# Patient Record
Sex: FEMALE | Race: WHITE | Employment: FULL TIME | URBAN - METROPOLITAN AREA
[De-identification: names, ages, dates, MRNs, and addresses within clinical notes are randomized per-mention and may not be internally consistent; named-entity substitution may affect disease eponyms.]

---

## 2017-03-10 ENCOUNTER — TELEPHONE (OUTPATIENT)
Dept: FAMILY MEDICINE CLINIC | Age: 32
End: 2017-03-10

## 2017-03-22 ENCOUNTER — EMPLOYEE WELLNESS (OUTPATIENT)
Dept: OTHER | Age: 32
End: 2017-03-22

## 2017-04-04 ENCOUNTER — OFFICE VISIT (OUTPATIENT)
Dept: FAMILY MEDICINE CLINIC | Age: 32
End: 2017-04-04

## 2017-04-04 VITALS
BODY MASS INDEX: 21.22 KG/M2 | HEART RATE: 60 BPM | WEIGHT: 135.2 LBS | SYSTOLIC BLOOD PRESSURE: 108 MMHG | DIASTOLIC BLOOD PRESSURE: 72 MMHG | TEMPERATURE: 98 F | HEIGHT: 67 IN

## 2017-04-04 DIAGNOSIS — J30.9 ALLERGIC RHINITIS, UNSPECIFIED ALLERGIC RHINITIS TRIGGER, UNSPECIFIED RHINITIS SEASONALITY: ICD-10-CM

## 2017-04-04 DIAGNOSIS — F41.9 ANXIETY: ICD-10-CM

## 2017-04-04 DIAGNOSIS — Z00.00 ANNUAL PHYSICAL EXAM: Primary | ICD-10-CM

## 2017-04-04 DIAGNOSIS — J45.909 UNCOMPLICATED ASTHMA, UNSPECIFIED ASTHMA SEVERITY: ICD-10-CM

## 2017-04-04 PROCEDURE — 99395 PREV VISIT EST AGE 18-39: CPT | Performed by: FAMILY MEDICINE

## 2017-04-04 RX ORDER — CLONAZEPAM 0.5 MG/1
0.5 TABLET ORAL 2 TIMES DAILY
Qty: 30 TABLET | Refills: 1 | Status: SHIPPED | OUTPATIENT
Start: 2017-04-04 | End: 2017-11-17 | Stop reason: SDUPTHER

## 2017-04-04 RX ORDER — CETIRIZINE HYDROCHLORIDE 10 MG/1
10 TABLET ORAL DAILY PRN
COMMUNITY
End: 2021-02-15

## 2017-04-04 RX ORDER — BUDESONIDE AND FORMOTEROL FUMARATE DIHYDRATE 80; 4.5 UG/1; UG/1
2 AEROSOL RESPIRATORY (INHALATION) 2 TIMES DAILY
Qty: 1 INHALER | Refills: 11 | Status: SHIPPED | OUTPATIENT
Start: 2017-04-04 | End: 2020-12-01

## 2017-04-04 RX ORDER — MONTELUKAST SODIUM 10 MG/1
10 TABLET ORAL NIGHTLY
Qty: 30 TABLET | Refills: 11 | Status: SHIPPED | OUTPATIENT
Start: 2017-04-04 | End: 2018-04-09

## 2017-04-04 ASSESSMENT — ENCOUNTER SYMPTOMS
EYES NEGATIVE: 1
BLOOD IN STOOL: 0
DIARRHEA: 0
WHEEZING: 1
ABDOMINAL PAIN: 0
VOMITING: 0
NAUSEA: 0
SHORTNESS OF BREATH: 0

## 2017-06-13 ENCOUNTER — PATIENT MESSAGE (OUTPATIENT)
Dept: FAMILY MEDICINE CLINIC | Age: 32
End: 2017-06-13

## 2017-06-13 RX ORDER — PREDNISONE 10 MG/1
TABLET ORAL
Qty: 30 TABLET | Refills: 0 | Status: SHIPPED | OUTPATIENT
Start: 2017-06-13 | End: 2017-06-23

## 2017-07-14 ENCOUNTER — PATIENT MESSAGE (OUTPATIENT)
Dept: FAMILY MEDICINE CLINIC | Age: 32
End: 2017-07-14

## 2017-07-17 RX ORDER — CITALOPRAM 20 MG/1
20 TABLET ORAL DAILY
Qty: 30 TABLET | Refills: 1 | Status: SHIPPED | OUTPATIENT
Start: 2017-07-17 | End: 2017-09-08 | Stop reason: SDUPTHER

## 2017-09-08 RX ORDER — CITALOPRAM 20 MG/1
20 TABLET ORAL DAILY
Qty: 30 TABLET | Refills: 11 | Status: SHIPPED | OUTPATIENT
Start: 2017-09-08 | End: 2018-04-09 | Stop reason: SDUPTHER

## 2017-11-17 DIAGNOSIS — F41.9 ANXIETY: ICD-10-CM

## 2017-11-17 RX ORDER — CLONAZEPAM 0.5 MG/1
0.5 TABLET ORAL 2 TIMES DAILY
Qty: 30 TABLET | Refills: 1 | Status: SHIPPED | OUTPATIENT
Start: 2017-11-17 | End: 2019-04-29 | Stop reason: SDUPTHER

## 2017-11-17 NOTE — TELEPHONE ENCOUNTER
Rx sent to pharmacy. CG    Controlled Substances Monitoring:     Attestation: The Prescription Monitoring Report for this patient was reviewed today. Williams Santana MD)  Documentation: No signs of potential drug abuse or diversion identified.  Williams Santana MD)        Electronically signed by Williams Santana MD on 11/17/2017 at 10:17 AM

## 2017-11-29 ENCOUNTER — PATIENT MESSAGE (OUTPATIENT)
Dept: FAMILY MEDICINE CLINIC | Age: 32
End: 2017-11-29

## 2017-11-29 RX ORDER — LEVONORGESTREL / ETHINYL ESTRADIOL AND ETHINYL ESTRADIOL 150-30(84)
KIT ORAL
Qty: 91 TABLET | Refills: 3 | Status: SHIPPED | OUTPATIENT
Start: 2017-11-29 | End: 2017-11-29

## 2017-11-29 NOTE — TELEPHONE ENCOUNTER
From: Eddie Lin  To: Eleuterio Brown MD  Sent: 11/29/2017 3:47 PM EST  Subject: Prescription Question    Dr Deisy Stringer    I am messaging you about Canfield Bodily ([birth year]). At her appointment last month we have discussed birth control options, and I think she would like to try the pill, versus the shot for now. Due to her heavy flows and cramping, are we able to start her on something like Seasonique?      Thanks  Eddie Lin

## 2018-03-20 VITALS — WEIGHT: 139 LBS | BODY MASS INDEX: 21.77 KG/M2

## 2018-03-27 RX ORDER — ALBUTEROL SULFATE 90 UG/1
2 AEROSOL, METERED RESPIRATORY (INHALATION) EVERY 6 HOURS PRN
Qty: 1 INHALER | Refills: 3 | Status: SHIPPED | OUTPATIENT
Start: 2018-03-27 | End: 2019-04-29 | Stop reason: SDUPTHER

## 2018-04-09 ENCOUNTER — OFFICE VISIT (OUTPATIENT)
Dept: FAMILY MEDICINE CLINIC | Age: 33
End: 2018-04-09
Payer: COMMERCIAL

## 2018-04-09 VITALS
SYSTOLIC BLOOD PRESSURE: 110 MMHG | WEIGHT: 150 LBS | DIASTOLIC BLOOD PRESSURE: 74 MMHG | BODY MASS INDEX: 22.73 KG/M2 | HEIGHT: 68 IN | HEART RATE: 84 BPM | RESPIRATION RATE: 12 BRPM

## 2018-04-09 DIAGNOSIS — M54.41 CHRONIC LOW BACK PAIN WITH RIGHT-SIDED SCIATICA, UNSPECIFIED BACK PAIN LATERALITY: ICD-10-CM

## 2018-04-09 DIAGNOSIS — G89.29 CHRONIC LOW BACK PAIN WITH RIGHT-SIDED SCIATICA, UNSPECIFIED BACK PAIN LATERALITY: ICD-10-CM

## 2018-04-09 DIAGNOSIS — F41.9 ANXIETY: ICD-10-CM

## 2018-04-09 DIAGNOSIS — Z00.00 ANNUAL PHYSICAL EXAM: Primary | ICD-10-CM

## 2018-04-09 PROBLEM — J45.909 UNCOMPLICATED ASTHMA: Status: ACTIVE | Noted: 2018-04-09

## 2018-04-09 PROCEDURE — 99395 PREV VISIT EST AGE 18-39: CPT | Performed by: FAMILY MEDICINE

## 2018-04-09 RX ORDER — CITALOPRAM 40 MG/1
40 TABLET ORAL DAILY
Qty: 90 TABLET | Refills: 3 | Status: SHIPPED | OUTPATIENT
Start: 2018-04-09 | End: 2019-04-29 | Stop reason: ALTCHOICE

## 2018-04-09 ASSESSMENT — ENCOUNTER SYMPTOMS
SHORTNESS OF BREATH: 0
BACK PAIN: 1
GASTROINTESTINAL NEGATIVE: 1

## 2018-04-09 ASSESSMENT — PATIENT HEALTH QUESTIONNAIRE - PHQ9
SUM OF ALL RESPONSES TO PHQ9 QUESTIONS 1 & 2: 2
2. FEELING DOWN, DEPRESSED OR HOPELESS: 1
1. LITTLE INTEREST OR PLEASURE IN DOING THINGS: 1
SUM OF ALL RESPONSES TO PHQ QUESTIONS 1-9: 2

## 2018-04-10 ENCOUNTER — TELEPHONE (OUTPATIENT)
Dept: FAMILY MEDICINE CLINIC | Age: 33
End: 2018-04-10

## 2018-04-10 LAB
CHOLESTEROL/HDL RATIO: 3.3
CHOLESTEROL: 182 MG/DL
GLUCOSE: 90 MG/DL (ref 70–99)
HDLC SERPL-MCNC: 55 MG/DL
LDL CHOLESTEROL CALCULATED: 94 MG/DL
LDL/HDL RATIO: 1.7
TRIGL SERPL-MCNC: 163 MG/DL
VLDLC SERPL CALC-MCNC: 33 MG/DL

## 2018-07-23 ENCOUNTER — HOSPITAL ENCOUNTER (OUTPATIENT)
Age: 33
Discharge: HOME | End: 2018-07-23
Payer: COMMERCIAL

## 2018-07-23 ENCOUNTER — HOSPITAL ENCOUNTER (OUTPATIENT)
Dept: GENERAL RADIOLOGY | Age: 33
Discharge: HOME | End: 2018-07-23
Payer: COMMERCIAL

## 2018-07-23 DIAGNOSIS — G89.29 CHRONIC LOW BACK PAIN WITH RIGHT-SIDED SCIATICA, UNSPECIFIED BACK PAIN LATERALITY: ICD-10-CM

## 2018-07-23 DIAGNOSIS — M54.41 CHRONIC LOW BACK PAIN WITH RIGHT-SIDED SCIATICA, UNSPECIFIED BACK PAIN LATERALITY: ICD-10-CM

## 2018-07-23 PROCEDURE — 72100 X-RAY EXAM L-S SPINE 2/3 VWS: CPT

## 2018-07-24 ENCOUNTER — TELEPHONE (OUTPATIENT)
Dept: FAMILY MEDICINE CLINIC | Age: 33
End: 2018-07-24

## 2018-07-24 DIAGNOSIS — M51.35 DDD (DEGENERATIVE DISC DISEASE), THORACOLUMBAR: ICD-10-CM

## 2018-07-24 DIAGNOSIS — M54.5 LOW BACK PAIN, UNSPECIFIED BACK PAIN LATERALITY, UNSPECIFIED CHRONICITY, WITH SCIATICA PRESENCE UNSPECIFIED: Primary | ICD-10-CM

## 2018-07-26 NOTE — TELEPHONE ENCOUNTER
Patient notified and would like to pursue PT--Mae HADDAD. Order placed in epic--they will contact the patient to schedule.

## 2018-08-16 ENCOUNTER — HOSPITAL ENCOUNTER (OUTPATIENT)
Dept: PHYSICAL THERAPY | Age: 33
Setting detail: THERAPIES SERIES
Discharge: HOME | End: 2018-08-16
Payer: COMMERCIAL

## 2018-08-30 ENCOUNTER — HOSPITAL ENCOUNTER (OUTPATIENT)
Dept: PHYSICAL THERAPY | Age: 33
Setting detail: THERAPIES SERIES
Discharge: HOME | End: 2018-08-30
Payer: COMMERCIAL

## 2018-08-30 PROCEDURE — 97161 PT EVAL LOW COMPLEX 20 MIN: CPT

## 2018-08-30 ASSESSMENT — PAIN DESCRIPTION - ORIENTATION: ORIENTATION: RIGHT

## 2018-08-30 ASSESSMENT — PAIN DESCRIPTION - LOCATION: LOCATION: BACK

## 2018-08-30 ASSESSMENT — PAIN SCALES - GENERAL: PAINLEVEL_OUTOF10: 7

## 2018-08-30 ASSESSMENT — PAIN DESCRIPTION - PAIN TYPE: TYPE: CHRONIC PAIN

## 2018-08-30 ASSESSMENT — PAIN DESCRIPTION - FREQUENCY: FREQUENCY: CONTINUOUS

## 2018-08-30 NOTE — FLOWSHEET NOTE
** PLEASE SIGN, DATE AND TIME CERTIFICATION BELOW AND RETURN TO [facility] OUTPATIENT REHABILITATION (FAX #: [phone]). ATTEST/CO-SIGN IF ACCESSING VIA [employer]. THANK YOU.**    I certify that I have examined the patient below and determined that Physical Medicine and Rehabilitation service is necessary and that I approve the established plan of care for up to 90 days or as specifically noted. Attestation, signature or co-signature of physician indicates approval of certification requirements.    ________________________ ____________ __________  Physician Signature   Date   Time       [address]     Time In: 5657  Time Out: [address]  Minutes: 40  Timed Code Treatment Minutes: 0 Minutes     Date: 2018  Patient Name: Maxi Quinn,  Gender:  female        CSN: [id number]   : 1985  (34 y.o.)  Referral Date : 18     Referring Practitioner: Dr Stefanie Hodgkin      Diagnosis: Low back pain with sciatica, , DDD, thoracolumbar pain   Treatment Diagnosis: lumbar pain, right low back pain , core weakness  Additional Pertinent Hx: PMH  asthma, kidney stones. General:  PT Visit Information  Onset Date: 12  PT Insurance Information: [employer], 30% co pay,   allow 60 viist per year  $3150 deductible -- not met   Total # of Visits Approved: 60  Total # of Visits to Date: 1  Plan of Care/Certification Expiration Date: 18                        See Medical History Questionnaire for information related to allergies and medications. Subjective:  Comments: Pt concerned with cost of PT,  may be interested in doing mostly home program   x rays -- showed    DDD   Other (Comment): allergic to LATEX       Subjective: David Moses reports that she has had low back pain since she was in an [place] [address] in . She states she did have treaemnt at that time due to being pregnant and high co pays.    She stated that her back pain has become progressively worse. Pain:  Patient Currently in Pain: Yes  Pain Assessment: 0-10  Pain Level: 7  Pain Type: Chronic pain  Pain Location: Back  Pain Orientation: Right  Pain Radiating Towards: lateral thigh  anc calf on the right ,   Pain Frequency: Continuous  Effect of Pain on Daily Activities: increases with sitting, standing, lifting, bending, squatting, cleaning her house, sport  releive with lying on her stomach ,      Social/Functional History:    Lives With: Spouse  Type of Home: House  Home Layout: Two level  Home Access: Stairs to enter with rails             ADL Assistance: Independent  Homemaking Assistance: Independent  Homemaking Responsibilities: Yes  Ambulation Assistance: Independent  Transfer Assistance: Independent    Active : Yes  Occupation: Full time employment  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  standing, bending,  reaching, can sit on stool   Leisure & Hobbies: would like to return to jog- now b=pain with quick walk , and moderateincreased  pain the day after  Additional Comments: has 6 children 3 to 17 years,     Objective                 Observation/Palpation  Posture: Good  Palpation: right L5S1 , lateral sacrum , to greater trochanter tenderenss   Observation: decreased stance on the right leg,  hips level       ROM  Lumbar: lumbar flexion decreased 50%, painful, flexed to knees, extension 25% and no increase in pain ,          Strength RLE: Exception  Comment: right hip abd, flex, ane ext 4/5,     Strength LLE: WFL             Special Tests: SI joint compression right + for incresed pain , neg Left,   Other: prone-- position of confort.   prop                              Exercises  Exercise 1: focus on HEP-- stabilize pelvis, return to [facility]-- , quick walk, ? jog    Exercise 2: Supine  abd sets, glut sets, quad sets 5 sec x 10,  SLR with oppsosite leg bent up  -- 10 reps and stabilize abd musscle to aovid pain in th eright pelvis  Exercise 3:

## 2018-09-07 ENCOUNTER — HOSPITAL ENCOUNTER (OUTPATIENT)
Dept: PHYSICAL THERAPY | Age: 33
Setting detail: THERAPIES SERIES
Discharge: HOME | End: 2018-09-07
Payer: MEDICARE

## 2018-09-07 PROCEDURE — 97110 THERAPEUTIC EXERCISES: CPT

## 2018-09-07 ASSESSMENT — PAIN SCALES - GENERAL: PAINLEVEL_OUTOF10: 2

## 2018-09-07 ASSESSMENT — PAIN DESCRIPTION - LOCATION: LOCATION: BACK

## 2018-09-07 ASSESSMENT — PAIN DESCRIPTION - PAIN TYPE: TYPE: CHRONIC PAIN

## 2018-09-07 ASSESSMENT — PAIN DESCRIPTION - ORIENTATION: ORIENTATION: RIGHT

## 2018-09-10 ENCOUNTER — HOSPITAL ENCOUNTER (OUTPATIENT)
Dept: PHYSICAL THERAPY | Age: 33
Setting detail: THERAPIES SERIES
Discharge: HOME | End: 2018-09-10
Payer: MEDICARE

## 2018-09-10 PROCEDURE — 97110 THERAPEUTIC EXERCISES: CPT

## 2018-09-10 NOTE — PROGRESS NOTES
[facility] 59  OUTPATIENT PHYSICAL THERAPY  DAILY NOTE  [facility]     Time In: 8768  Time Out: 0800  Minutes: 25  Timed Code Treatment Minutes: 25 Minutes     Date: 9/10/2018  Patient Name: Jennifer Helm,  Gender:  female        CSN: [id number]   : 1985  (34 y.o.)  Referral Date : 18    Referring Practitioner: Dr Matthew Clark      Diagnosis: Low back pain with sciatica, , DDD, thoracolumbar pain   Treatment Diagnosis: lumbar pain, right low back pain , core weakness   Additional Pertinent Hx: PMH  asthma, kidney stones. General:  PT Visit Information  Onset Date: 12  PT Insurance Information: [employer], 30% co pay,   allow 60 viist per year  $3150 deductible -- not met   Total # of Visits Approved: 60  Total # of Visits to Date: 3  Plan of Care/Certification Expiration Date: 18               Subjective:  Chart Reviewed: Yes  Other (Comment): allergic to LATEX       Subjective: pain 1/10  -- right SI joint     Pain:  Patient Currently in Pain: Yes         Objective          Exercises  Exercise 1: NuStep x 5 min, seat 5, arms 8   ALLERGIC to LATEX   Exercise 2: In // - marches-- limited height  avoid right SI joint motion, mini squat, heel/toe x 10  Exercise 3: 3-way hip without resistance x 10-- single layer latex free t band standing on 1 side  Exercise 4: supine- quad sets, glute sets, abd sets x 10  Exercise 5: pelvic tilts, bridges, LTR x 10 ea  Exercise 6: SLR, hip abd/add, SAQs x 10         Activity Tolerance:  Activity Tolerance: Patient Tolerated treatment well    Assessment:   Body structures, Functions, Activity limitations: Decreased functional mobility , Decreased ROM, Decreased ADL status  Assessment: mild increase in right si joing pain after treament , she was to ice at home    Prognosis: Good       Patient Education:  Patient Education: t band 3 way hip                       Plan:  Plan Comment: cont with POC    Goals:  Patient goals : less riht sided leg and back pain ,  return to exericse including [employer]? jog,  5K? Short term goals  Time Frame for Short term goals: 4 weeks  Short term goal 1: increase strength the core, B Hips,  to 4/5   Short term goal 2: pt will deomstrate good technique fo r15 rpe of lumbr stabs in supine, sitting and standing to prevent further strain of the lubmar spine  Short term goal 3: intiate home program     Long term goals  Time Frame for Long term goals : 8 week -- if needed  Long term goal 1: I HEP to achieve above goals  Long term goal 2: increase core adn B hip strength to 5/5 to stab  her back so she is able to lift her child and do her cleaning withut pain greater than 3/10 the next day  Long term goal 3: increase right hip mobility to be equal to the left  Long term goal 4: pt to deomstrate good body mechainics with all activities during 30 min treament period.           Kathi Mejias, PT

## 2018-09-18 ENCOUNTER — HOSPITAL ENCOUNTER (OUTPATIENT)
Dept: PHYSICAL THERAPY | Age: 33
Setting detail: THERAPIES SERIES
Discharge: HOME | End: 2018-09-18
Payer: MEDICARE

## 2018-09-18 PROCEDURE — 97110 THERAPEUTIC EXERCISES: CPT

## 2018-09-18 ASSESSMENT — PAIN SCALES - GENERAL: PAINLEVEL_OUTOF10: 6

## 2018-09-18 ASSESSMENT — PAIN DESCRIPTION - ORIENTATION: ORIENTATION: RIGHT

## 2018-09-18 ASSESSMENT — PAIN DESCRIPTION - PAIN TYPE: TYPE: CHRONIC PAIN

## 2018-09-18 ASSESSMENT — PAIN DESCRIPTION - LOCATION: LOCATION: BACK

## 2018-09-18 NOTE — PROGRESS NOTES
well    Assessment: Body structures, Functions, Activity limitations: Decreased functional mobility , Decreased ROM, Decreased ADL status  Prognosis: Good  Discharge Recommendations: Continue to assess pending progress    Patient Education:  Patient Education: t band 3 way hip                       Plan:  Times per week: 1-2   Plan weeks: 4-8  Specific instructions for Next Treatment: lumbar stabs, pelvic stbs, ice to right SI joint region, HEP , body mechnicss. , return to [place]   Current Treatment Recommendations: Strengthening, Home Exercise Program, Positioning, Modalities, Functional Mobility Training  Plan Comment: cont with POC    Goals:  Patient goals : less riht sided leg and back pain ,  return to exericse including [employer]? jog,  5K? Short term goals  Time Frame for Short term goals: 4 weeks  Short term goal 1: increase strength the core, B Hips,  to 4/5   Short term goal 2: pt will deomstrate good technique fo r15 rpe of lumbr stabs in supine, sitting and standing to prevent further strain of the lubmar spine  Short term goal 3: intiate home program     Long term goals  Time Frame for Long term goals : 8 week -- if needed  Long term goal 1: I HEP to achieve above goals  Long term goal 2: increase core adn B hip strength to 5/5 to stab  her back so she is able to lift her child and do her cleaning withut pain greater than 3/10 the next day  Long term goal 3: increase right hip mobility to be equal to the left  Long term goal 4: pt to deomstrate good body mechainics with all activities during 30 min treament period.           Annie Levin  [id number]

## 2018-09-21 ENCOUNTER — HOSPITAL ENCOUNTER (OUTPATIENT)
Dept: PHYSICAL THERAPY | Age: 33
Setting detail: THERAPIES SERIES
Discharge: HOME | End: 2018-09-21
Payer: MEDICARE

## 2018-09-21 PROCEDURE — 97010 HOT OR COLD PACKS THERAPY: CPT

## 2018-09-21 PROCEDURE — 97110 THERAPEUTIC EXERCISES: CPT

## 2018-09-21 ASSESSMENT — PAIN SCALES - GENERAL: PAINLEVEL_OUTOF10: 6

## 2018-09-21 ASSESSMENT — PAIN DESCRIPTION - ORIENTATION: ORIENTATION: RIGHT

## 2018-09-21 ASSESSMENT — PAIN DESCRIPTION - LOCATION: LOCATION: BACK

## 2018-09-21 ASSESSMENT — PAIN DESCRIPTION - PAIN TYPE: TYPE: CHRONIC PAIN

## 2018-09-21 NOTE — PROGRESS NOTES
[facility] 59  OUTPATIENT PHYSICAL THERAPY  DAILY NOTE  [facility]     Time In: 9902  Time Out: 0830  Minutes: 40  Timed Code Treatment Minutes: 40 Minutes     Date: 2018  Patient Name: Marbella Veras,  Gender:  female        CSN: [id number]   : 1985  (34 y.o.)  Referral Date : 18    Referring Practitioner: Dr Leeanna Guadarrama      Diagnosis: Low back pain with sciatica, , DDD, thoracolumbar pain   Treatment Diagnosis: lumbar pain, right low back pain , core weakness   Additional Pertinent Hx: PMH  asthma, kidney stones. General:  PT Visit Information  Onset Date: 12  PT Insurance Information: [employer], 30% co pay,   allow 60 viist per year  $3150 deductible -- not met   Total # of Visits Approved: 60  Total # of Visits to Date: 5  Plan of Care/Certification Expiration Date: 18               Subjective:  Chart Reviewed: Yes  Comments: Reports increased pain after working the past two days  Other (Comment): allergic to LATEX             Pain:  Patient Currently in Pain: Yes  Pain Assessment: 0-10  Pain Level: 6  Pain Type: Chronic pain  Pain Location: Back  Pain Orientation: Right      Objective                                                                                                                          Exercises  Exercise 1: NuStep x 5 min, seat 5, arms 8   ALLERGIC to LATEX   Exercise 2: In // - on blue foam marches-- limited height  avoid right SI joint motion, mini squat, heel/toe x 10  Exercise 3: 3-way hip with Yellow Latex Free T band x 10-- single layer latex free t band standing on 1 side  Exercise 4: supine- quad sets, glute sets, abd sets x 10  Exercise 5: pelvic tilts, bridges, LTR x 10 ea  Exercise 6: SLR, hip abd/add, SAQs x 10  Exercise 7: DLSP- UE x 10, LE x 10, UE/LE x 10  Exercise 8: MHP to LBx 10 min at end of session- prone  Exercise 20:  All ther ex for increased strength and functional mobility         Activity

## 2018-09-26 ENCOUNTER — HOSPITAL ENCOUNTER (OUTPATIENT)
Dept: PHYSICAL THERAPY | Age: 33
Setting detail: THERAPIES SERIES
Discharge: HOME | End: 2018-09-26
Payer: MEDICARE

## 2018-09-26 PROCEDURE — 97110 THERAPEUTIC EXERCISES: CPT

## 2018-09-26 ASSESSMENT — PAIN DESCRIPTION - PAIN TYPE: TYPE: CHRONIC PAIN

## 2018-09-26 ASSESSMENT — PAIN DESCRIPTION - LOCATION: LOCATION: BACK

## 2018-09-26 ASSESSMENT — PAIN DESCRIPTION - ORIENTATION: ORIENTATION: RIGHT

## 2018-09-26 ASSESSMENT — PAIN SCALES - GENERAL: PAINLEVEL_OUTOF10: 5

## 2018-09-26 NOTE — PROGRESS NOTES
Exercise 20: All ther ex for increased strength and functional mobility         Activity Tolerance:  Activity Tolerance: Patient Tolerated treatment well    Assessment: Body structures, Functions, Activity limitations: Decreased functional mobility , Decreased ROM, Decreased ADL status  Assessment: Held some standing exercises and initiated stability ball training with good tolerance. Prognosis: Good  Discharge Recommendations: Continue to assess pending progress    Patient Education:  Patient Education: ther ex     Plan:  Times per week: 1-2   Plan weeks: 4-8  Specific instructions for Next Treatment: lumbar stabs, pelvic stbs, ice to right SI joint region, HEP , body mechnicss. , return to [facility]   Current Treatment Recommendations: Strengthening, Home Exercise Program, Positioning, Modalities, Functional Mobility Training  Plan Comment: cont with POC    Goals:  Patient goals : less riht sided leg and back pain ,  return to exericse including [employer]? jog,  5K? Short term goals  Time Frame for Short term goals: 4 weeks  Short term goal 1: increase strength the core, B Hips,  to 4/5   Short term goal 2: pt will deomstrate good technique fo r15 rpe of lumbr stabs in supine, sitting and standing to prevent further strain of the lubmar spine  Short term goal 3: intiate home program     Long term goals  Time Frame for Long term goals : 8 week -- if needed  Long term goal 1: I HEP to achieve above goals  Long term goal 2: increase core adn B hip strength to 5/5 to stab  her back so she is able to lift her child and do her cleaning withut pain greater than 3/10 the next day  Long term goal 3: increase right hip mobility to be equal to the left  Long term goal 4: pt to deomstrate good body mechainics with all activities during 30 min treament period.           Harlan Siemens, PT

## 2018-09-27 ENCOUNTER — HOSPITAL ENCOUNTER (OUTPATIENT)
Dept: PHYSICAL THERAPY | Age: 33
Setting detail: THERAPIES SERIES
Discharge: HOME | End: 2018-09-27
Payer: MEDICARE

## 2018-09-27 PROCEDURE — 97110 THERAPEUTIC EXERCISES: CPT

## 2018-09-27 PROCEDURE — 97010 HOT OR COLD PACKS THERAPY: CPT

## 2018-09-27 ASSESSMENT — PAIN SCALES - GENERAL: PAINLEVEL_OUTOF10: 5

## 2018-09-27 ASSESSMENT — PAIN DESCRIPTION - LOCATION: LOCATION: BACK

## 2018-09-27 NOTE — PROGRESS NOTES
[facility]  OUTPATIENT PHYSICAL THERAPY  DAILY NOTE  [facility]     Time In: 5430  Time Out: 5293  Minutes: 30  Timed Code Treatment Minutes: 30 Minutes     Date: 2018  Patient Name: Celestina Castrejon,  Gender:  female        CSN: [id number]   : 1985  (34 y.o.)  Referral Date : 18    Referring Practitioner: Dr Angelica Keys      Diagnosis: Low back pain with sciatica, , DDD, thoracolumbar pain   Treatment Diagnosis: lumbar pain, right low back pain , core weakness   Additional Pertinent Hx: PMH  asthma, kidney stones. General:  PT Visit Information  Onset Date: 12  PT Insurance Information: [employer], 30% co pay,   allow 60 viist per year  $3150 deductible -- not met   Total # of Visits Approved: 60  Total # of Visits to Date: 7  Plan of Care/Certification Expiration Date: 18               Subjective:  Chart Reviewed: Yes  Other (Comment): allergic to LATEX       Subjective: Reports increased pain in LB today     Pain:  Patient Currently in Pain: Yes  Pain Assessment: 0-10  Pain Level: 5  Pain Location: Back      Objective                                                                                                                          Exercises  Exercise 1: NuStep x 5 min, seat 5, arms 8   ALLERGIC to LATEX   Exercise 5: pelvic tilts, bridges, LTR x 10 ea   Exercise 6: SLR, hip abd/add x 10  Exercise 7: DLSP- UE/LE x 10  Exercise 8: MHP to LBx 10 min at end of session prone  Exercise 9: Seated on large stability ball - abd brace 5 sec x5 reps, pelvic circles cw/ ccw, LAQ, marching x10   Exercise 10: Hooklying piriformis stretch x2 variations - figure 4 and pulling knee toward opp shoulder, and figure 4 full stretch x15 sec each LE. Exercise 20: All ther ex for increased strength and functional mobility         Activity Tolerance:  Activity Tolerance: Patient Tolerated treatment well    Assessment:   Body structures, Functions, Activity limitations: Decreased functional mobility , Decreased ROM, Decreased ADL status  Prognosis: Good  Discharge Recommendations: Continue to assess pending progress    Patient Education:  Patient Education: ther ex                       Plan:  Times per week: 1-2   Plan weeks: 4-8  Specific instructions for Next Treatment: lumbar stabs, pelvic stbs, ice to right SI joint region, HEP , body mechnicss. , return to [facility]   Current Treatment Recommendations: Strengthening, Home Exercise Program, Positioning, Modalities, Functional Mobility Training  Plan Comment: cont with POC    Goals:  Patient goals : less riht sided leg and back pain ,  return to exericse including [employer]? jog,  5K? Short term goals  Time Frame for Short term goals: 4 weeks  Short term goal 1: increase strength the core, B Hips,  to 4/5   Short term goal 2: pt will deomstrate good technique fo r15 rpe of lumbr stabs in supine, sitting and standing to prevent further strain of the lubmar spine  Short term goal 3: intiate home program     Long term goals  Time Frame for Long term goals : 8 week -- if needed  Long term goal 1: I HEP to achieve above goals  Long term goal 2: increase core adn B hip strength to 5/5 to stab  her back so she is able to lift her child and do her cleaning withut pain greater than 3/10 the next day  Long term goal 3: increase right hip mobility to be equal to the left  Long term goal 4: pt to deomstrate good body mechainics with all activities during 30 min treament period.           Marthabenedict Cormier  [id number]

## 2018-10-01 ENCOUNTER — HOSPITAL ENCOUNTER (OUTPATIENT)
Dept: PHYSICAL THERAPY | Age: 33
Setting detail: THERAPIES SERIES
Discharge: HOME | End: 2018-10-01
Payer: COMMERCIAL

## 2018-10-01 PROCEDURE — 97035 APP MDLTY 1+ULTRASOUND EA 15: CPT

## 2018-10-01 PROCEDURE — 97110 THERAPEUTIC EXERCISES: CPT

## 2018-10-03 ENCOUNTER — APPOINTMENT (OUTPATIENT)
Dept: PHYSICAL THERAPY | Age: 33
End: 2018-10-03
Payer: COMMERCIAL

## 2018-10-05 ENCOUNTER — HOSPITAL ENCOUNTER (OUTPATIENT)
Dept: PHYSICAL THERAPY | Age: 33
Setting detail: THERAPIES SERIES
Discharge: HOME | End: 2018-10-05
Payer: COMMERCIAL

## 2018-10-05 PROCEDURE — 97110 THERAPEUTIC EXERCISES: CPT

## 2018-10-05 ASSESSMENT — PAIN DESCRIPTION - LOCATION: LOCATION: BACK

## 2018-10-05 ASSESSMENT — PAIN DESCRIPTION - PAIN TYPE: TYPE: CHRONIC PAIN

## 2018-10-05 ASSESSMENT — PAIN DESCRIPTION - ORIENTATION: ORIENTATION: RIGHT

## 2018-10-05 ASSESSMENT — PAIN SCALES - GENERAL: PAINLEVEL_OUTOF10: 5

## 2018-10-05 NOTE — DISCHARGE SUMMARY
sec each LE. Exercise 11: REassessment 10/5/18 - R hip knee to chest no pain, R hip piriformis 25% limited compared to LLE. Prone knee flexion stretch WFL bilateral. See goals for strength assessment. Exercise 20: All ther ex for increased strength and functional mobility         Activity Tolerance:  Activity Tolerance: Patient Tolerated treatment well    Assessment:  Assessment: Patient overall has had very little improvement since starting therapy. She does report decreased frequency of RLE radicular pain but it is still aggravated after standing long durations at work. Her PCP has referred her to a spine specialist.   Prognosis: Good     Patient Education:  Patient Education: Advised patient try use of stability ball to decrease pressure on back in prone and seated. Plan:  Plan Comment: Discharge from PT    Goals:  Patient goals : less riht sided leg and back pain ,  return to exericse including [employer]? jog,  5K? Short term goals  Time Frame for Short term goals: 4 weeks  Short term goal 1: increase strength the core, B Hips,  to 4/5  - Not Met 10/5/18 - R hip abduction 3+/5 painful, R hip flex 4/5, R hip ext 4+/5, Hip add 5/5  Short term goal 2: pt will deomstrate good technique for 15 reps  of lumbr stabs in supine, sitting and standing to prevent further strain of the lumbar spine  Short term goal 3: intiate home program - Goal Met 10/5/18 - good recall of program without cues from therapist, performing daily     Long term goals  Time Frame for Long term goals : 8 week -- if needed  Long term goal 1: I HEP to achieve above goals   Long term goal 2: increase core adn B hip strength to 5/5 to stab  her back so she is able to lift her child and do her cleaning without pain greater than 3/10 the next day - Not Met 10/5/18 - pain still remains high at 5/10 or greater after working.    Long term goal 3: increase right hip mobility to be equal to the left - Not Met 10/5/18 - Hip ER and hamstring more limited RLE compared to left. Long term goal 4: pt to deomstrate good body mechainics with all activities during 30 min treament period.           Savage Mckeon, PT

## 2019-04-05 DIAGNOSIS — F41.9 ANXIETY: ICD-10-CM

## 2019-04-08 RX ORDER — CLONAZEPAM 0.5 MG/1
TABLET ORAL
Qty: 30 TABLET | Refills: 1 | OUTPATIENT
Start: 2019-04-08

## 2019-04-08 NOTE — TELEPHONE ENCOUNTER
Melissa Never needs refill of   Requested Prescriptions     Pending Prescriptions Disp Refills    clonazePAM (KLONOPIN) 0.5 MG tablet [Pharmacy Med Name: CLONAZEPAM 0.5 MG TABLET] 30 tablet 1     Sig: TAKE ONE TABLET BY MOUTH TWO TIMES A DAY       Last Filled on:  04/09/2018    Last Visit Date:  4/9/2018    Next Visit Date:    4/15/2019    Spoke to Pt, she has enough medication to last until her appointment.

## 2019-04-29 ENCOUNTER — OFFICE VISIT (OUTPATIENT)
Dept: FAMILY MEDICINE CLINIC | Age: 34
End: 2019-04-29
Payer: COMMERCIAL

## 2019-04-29 VITALS
WEIGHT: 144.4 LBS | SYSTOLIC BLOOD PRESSURE: 124 MMHG | HEIGHT: 67 IN | DIASTOLIC BLOOD PRESSURE: 80 MMHG | BODY MASS INDEX: 22.66 KG/M2 | RESPIRATION RATE: 12 BRPM | HEART RATE: 80 BPM

## 2019-04-29 DIAGNOSIS — J45.909 UNCOMPLICATED ASTHMA, UNSPECIFIED ASTHMA SEVERITY, UNSPECIFIED WHETHER PERSISTENT: ICD-10-CM

## 2019-04-29 DIAGNOSIS — Z00.00 ANNUAL PHYSICAL EXAM: Primary | ICD-10-CM

## 2019-04-29 DIAGNOSIS — F41.9 ANXIETY: ICD-10-CM

## 2019-04-29 PROCEDURE — 99395 PREV VISIT EST AGE 18-39: CPT | Performed by: FAMILY MEDICINE

## 2019-04-29 RX ORDER — ALBUTEROL SULFATE 90 UG/1
2 AEROSOL, METERED RESPIRATORY (INHALATION) EVERY 6 HOURS PRN
Qty: 1 INHALER | Refills: 3 | Status: SHIPPED | OUTPATIENT
Start: 2019-04-29 | End: 2021-12-02 | Stop reason: SDUPTHER

## 2019-04-29 RX ORDER — CLONAZEPAM 0.5 MG/1
0.5 TABLET ORAL 2 TIMES DAILY
Qty: 30 TABLET | Refills: 0 | Status: SHIPPED | OUTPATIENT
Start: 2019-04-29 | End: 2021-02-15

## 2019-04-29 ASSESSMENT — PATIENT HEALTH QUESTIONNAIRE - PHQ9
SUM OF ALL RESPONSES TO PHQ QUESTIONS 1-9: 0
SUM OF ALL RESPONSES TO PHQ QUESTIONS 1-9: 0
2. FEELING DOWN, DEPRESSED OR HOPELESS: 0
SUM OF ALL RESPONSES TO PHQ9 QUESTIONS 1 & 2: 0
1. LITTLE INTEREST OR PLEASURE IN DOING THINGS: 0

## 2019-04-29 ASSESSMENT — ENCOUNTER SYMPTOMS
ABDOMINAL PAIN: 0
SHORTNESS OF BREATH: 0
NAUSEA: 0
VOMITING: 0
EYES NEGATIVE: 1
BLOOD IN STOOL: 0
DIARRHEA: 0

## 2019-04-29 NOTE — PROGRESS NOTES
Chief Complaint   Patient presents with    Check-Up     refill medication       SUBJECTIVE     Roselyn Avery is a 28 y. o.female    Pt presents for annual wellness physical exam.        Pt stable since last visit- no newproblems for diagnoses listed below:  Patient Active Problem List   Diagnosis    Anxiety    Uncomplicated asthma     Doing well. Has stopped her celexa and feels that she does well without it. Uses klonopin prn, maybe once a month. Anxiety is stable. No depression. She is working on diet and is getting back to exercise. She will be changing jobs soon. Uses albuterol prn wheezing with exercise. No changes in family history. Nonsmoker. Body mass index is 22.82 kg/m². Review of Systems   Constitutional: Negative for chills, fatigue and fever. HENT: Negative. Eyes: Negative. Respiratory: Negative for shortness of breath. Cardiovascular: Negative for chest pain, palpitations and leg swelling. Gastrointestinal: Negative for abdominal pain, blood in stool, diarrhea, nausea and vomiting. Genitourinary: Negative for dysuria. Musculoskeletal: Negative for arthralgias and myalgias. Skin: Negative for rash. Neurological: Negative for dizziness and headaches. Hematological: Negative. Psychiatric/Behavioral: Negative. The patient is not nervous/anxious. All other systems reviewed and are negative. OBJECTIVE     /80 (Site: Right Upper Arm, Position: Sitting, Cuff Size: Small Adult)   Pulse 80   Resp 12   Ht 5' 6.7\" (1.694 m)   Wt 144 lb 6.4 oz (65.5 kg)   LMP 04/08/2019 (Approximate)   Breastfeeding? No   BMI 22.82 kg/m²     Wt Readings from Last 3 Encounters:   04/29/19 144 lb 6.4 oz (65.5 kg)   04/09/18 150 lb (68 kg)   04/04/17 135 lb 3.2 oz (61.3 kg)       Physical Exam   Constitutional: She is oriented to person, place, and time. She appears well-developed and well-nourished. HENT:   Head: Normocephalic and atraumatic.    Mouth/Throat: Oropharynx is clear and moist.   TM's normal.   Eyes: Conjunctivae are normal.   Neck: Neck supple. Carotid bruit is not present. No thyromegaly present. Cardiovascular: Normal rate, regular rhythm and normal heart sounds. Exam reveals no gallop and no friction rub. No murmur heard. Pulmonary/Chest: Effort normal and breath sounds normal. She has no wheezes. Abdominal: Soft. Bowel sounds are normal. There is no tenderness. There is no rebound and no guarding. Musculoskeletal: She exhibits no edema. Lymphadenopathy:     She has no cervical adenopathy. Neurological: She is alert and oriented to person, place, and time. Skin: Skin is warm and dry. No rash noted. Psychiatric: She has a normal mood and affect. Her behavior is normal.   Vitals reviewed. Immunization History   Administered Date(s) Administered    Hepatitis B (Engerix-B) 10/11/2016, 12/30/2016    Hepatitis B, unspecified formulation 08/16/2003    Influenza Virus Vaccine 08/20/2014, 08/14/2018    Influenza Whole 09/16/2010    Pneumococcal Conjugate 7-valent 06/20/2011    Tdap (Boostrix, Adacel) 08/20/2014    Tetanus 08/16/2008         Health Maintenance   Topic Date Due    Pneumococcal 0-64 years Vaccine (1 of 1 - PPSV23) 10/08/1990    Cervical cancer screen  10/01/2016    DTaP/Tdap/Td vaccine (2 - Td) 08/20/2024    Flu vaccine  Completed    HIV screen  Completed    Varicella Vaccine  Discontinued         ASSESSMENT      1. Annual physical exam    2. Anxiety    3.  Uncomplicated asthma, unspecified asthma severity, unspecified whether persistent        PLAN        Orders Placed This Encounter   Procedures    Lipid Panel     Standing Status:   Future     Standing Expiration Date:   4/28/2020     Order Specific Question:   Is Patient Fasting?/# of Hours     Answer:   12    Glucose, Fasting     Standing Status:   Future     Standing Expiration Date:   4/28/2020       Requested Prescriptions     Signed Prescriptions Disp

## 2019-04-29 NOTE — PATIENT INSTRUCTIONS
Patient Education        Well Visit, Ages 25 to 48: Care Instructions  Your Care Instructions    Physical exams can help you stay healthy. Your doctor has checked your overall health and may have suggested ways to take good care of yourself. He or she also may have recommended tests. At home, you can help prevent illness with healthy eating, regular exercise, and other steps. Follow-up care is a key part of your treatment and safety. Be sure to make and go to all appointments, and call your doctor if you are having problems. It's also a good idea to know your test results and keep a list of the medicines you take. How can you care for yourself at home? · Reach and stay at a healthy weight. This will lower your risk for many problems, such as obesity, diabetes, heart disease, and high blood pressure. · Get at least 30 minutes of physical activity on most days of the week. Walking is a good choice. You also may want to do other activities, such as running, swimming, cycling, or playing tennis or team sports. Discuss any changes in your exercise program with your doctor. · Do not smoke or allow others to smoke around you. If you need help quitting, talk to your doctor about stop-smoking programs and medicines. These can increase your chances of quitting for good. · Talk to your doctor about whether you have any risk factors for sexually transmitted infections (STIs). Having one sex partner (who does not have STIs and does not have sex with anyone else) is a good way to avoid these infections. · Use birth control if you do not want to have children at this time. Talk with your doctor about the choices available and what might be best for you. · Protect your skin from too much sun. When you're outdoors from 10 a.m. to 4 p.m., stay in the shade or cover up with clothing and a hat with a wide brim. Wear sunglasses that block UV rays.  Even when it's cloudy, put broad-spectrum sunscreen (SPF 30 or higher) on any exposed skin. · See a dentist one or two times a year for checkups and to have your teeth cleaned. · Wear a seat belt in the car. · Drink alcohol in moderation, if at all. That means no more than 2 drinks a day for men and 1 drink a day for women. Follow your doctor's advice about when to have certain tests. These tests can spot problems early. For everyone  · Cholesterol. Have the fat (cholesterol) in your blood tested after age 21. Your doctor will tell you how often to have this done based on your age, family history, or other things that can increase your risk for heart disease. · Blood pressure. Have your blood pressure checked during a routine doctor visit. Your doctor will tell you how often to check your blood pressure based on your age, your blood pressure results, and other factors. · Vision. Talk with your doctor about how often to have a glaucoma test.  · Diabetes. Ask your doctor whether you should have tests for diabetes. · Colon cancer. Have a test for colon cancer at age 48. You may have one of several tests. If you are younger than 48, you may need a test earlier if you have any risk factors. Risk factors include whether you already had a precancerous polyp removed from your colon or whether your parent, brother, sister, or child has had colon cancer. For women  · Breast exam and mammogram. Talk to your doctor about when you should have a clinical breast exam and a mammogram. Medical experts differ on whether and how often women under 50 should have these tests. Your doctor can help you decide what is right for you. · Pap test and pelvic exam. Begin Pap tests at age 24. A Pap test is the best way to find cervical cancer. The test often is part of a pelvic exam. Ask how often to have this test.  · Tests for sexually transmitted infections (STIs). Ask whether you should have tests for STIs.  You may be at risk if you have sex with more than one person, especially if your partners do not wear condoms. For men  · Tests for sexually transmitted infections (STIs). Ask whether you should have tests for STIs. You may be at risk if you have sex with more than one person, especially if you do not wear a condom. · Testicular cancer exam. Ask your doctor whether you should check your testicles regularly. · Prostate exam. Talk to your doctor about whether you should have a blood test (called a PSA test) for prostate cancer. Experts differ on whether and when men should have this test. Some experts suggest it if you are older than 39 and are -American or have a father or brother who got prostate cancer when he was younger than 72. When should you call for help? Watch closely for changes in your health, and be sure to contact your doctor if you have any problems or symptoms that concern you. Where can you learn more? Go to [email or web address] org and sign in to your Walltik account. Enter P072 in the Vistaar box to learn more about \"Well Visit, Ages 25 to 48: Care Instructions. \"     If you do not have an account, please click on the \"Sign Up Now\" link. Current as of: March 28, 2018  Content Version: 11.9  © 8441-9091 [employer]. Care instructions adapted under license by [facility] ([facility]). If you have questions about a medical condition or this instruction, always ask your healthcare professional. [address]  any warranty or liability for your use of this information.

## 2020-03-03 ENCOUNTER — TELEPHONE (OUTPATIENT)
Dept: FAMILY MEDICINE CLINIC | Age: 35
End: 2020-03-03

## 2020-03-03 NOTE — TELEPHONE ENCOUNTER
Pt called and wanted to know if Dr Britni Smith would take her 3 kids as pts, rest of her family does go to her so she wanted to keep them all at the same Dr. Please review and advise.

## 2020-07-24 ENCOUNTER — TELEPHONE (OUTPATIENT)
Dept: FAMILY MEDICINE CLINIC | Age: 35
End: 2020-07-24

## 2020-07-24 NOTE — TELEPHONE ENCOUNTER
Anyone can get a test if they want, but we don't generally recommend testing people who don't have close contact to a known positive case. We are starting to run low on testing supplies. If her co-worker tests positive and she has had close contact with him/her, I would recommend testing.  CG

## 2020-07-24 NOTE — TELEPHONE ENCOUNTER
Pts coworker's father tested positive for COVID, employee got tested yesterday and doesn't have the results back yet. Pt works in close quarters with the coworker and is nervous about possibly getting COVID from him. She is asymptomatic. She would like to have an order for COVID testing done. OK for order? Please advise.

## 2020-12-01 ENCOUNTER — NURSE ONLY (OUTPATIENT)
Dept: LAB | Age: 35
End: 2020-12-01

## 2020-12-01 ENCOUNTER — TELEPHONE (OUTPATIENT)
Dept: FAMILY MEDICINE CLINIC | Age: 35
End: 2020-12-01

## 2020-12-01 ENCOUNTER — OFFICE VISIT (OUTPATIENT)
Dept: FAMILY MEDICINE CLINIC | Age: 35
End: 2020-12-01
Payer: COMMERCIAL

## 2020-12-01 VITALS
DIASTOLIC BLOOD PRESSURE: 76 MMHG | WEIGHT: 145 LBS | HEIGHT: 67 IN | RESPIRATION RATE: 16 BRPM | SYSTOLIC BLOOD PRESSURE: 124 MMHG | TEMPERATURE: 96.7 F | BODY MASS INDEX: 22.76 KG/M2 | HEART RATE: 64 BPM

## 2020-12-01 LAB
ALBUMIN SERPL-MCNC: 4.8 G/DL (ref 3.5–5.1)
ALP BLD-CCNC: 52 U/L (ref 38–126)
ALT SERPL-CCNC: 23 U/L (ref 11–66)
ANION GAP SERPL CALCULATED.3IONS-SCNC: 13 MEQ/L (ref 8–16)
AST SERPL-CCNC: 19 U/L (ref 5–40)
BILIRUB SERPL-MCNC: 0.4 MG/DL (ref 0.3–1.2)
BUN BLDV-MCNC: 16 MG/DL (ref 7–22)
CALCIUM SERPL-MCNC: 10 MG/DL (ref 8.5–10.5)
CHLORIDE BLD-SCNC: 103 MEQ/L (ref 98–111)
CHOLESTEROL, TOTAL: 216 MG/DL (ref 100–199)
CO2: 25 MEQ/L (ref 23–33)
CREAT SERPL-MCNC: 0.6 MG/DL (ref 0.4–1.2)
GFR SERPL CREATININE-BSD FRML MDRD: > 90 ML/MIN/1.73M2
GLUCOSE BLD-MCNC: 92 MG/DL (ref 70–108)
HBA1C MFR BLD: 4.9 % (ref 4.3–5.7)
HDLC SERPL-MCNC: 64 MG/DL
LDL CHOLESTEROL CALCULATED: 132 MG/DL
POTASSIUM SERPL-SCNC: 4.4 MEQ/L (ref 3.5–5.2)
SODIUM BLD-SCNC: 141 MEQ/L (ref 135–145)
TOTAL PROTEIN: 7.2 G/DL (ref 6.1–8)
TRIGL SERPL-MCNC: 102 MG/DL (ref 0–199)

## 2020-12-01 PROCEDURE — 99395 PREV VISIT EST AGE 18-39: CPT | Performed by: FAMILY MEDICINE

## 2020-12-01 PROCEDURE — 90686 IIV4 VACC NO PRSV 0.5 ML IM: CPT | Performed by: FAMILY MEDICINE

## 2020-12-01 PROCEDURE — 90471 IMMUNIZATION ADMIN: CPT | Performed by: FAMILY MEDICINE

## 2020-12-01 PROCEDURE — 83036 HEMOGLOBIN GLYCOSYLATED A1C: CPT | Performed by: FAMILY MEDICINE

## 2020-12-01 ASSESSMENT — ENCOUNTER SYMPTOMS
SHORTNESS OF BREATH: 0
ABDOMINAL PAIN: 0
BLOOD IN STOOL: 0
WHEEZING: 0
COUGH: 0

## 2020-12-01 ASSESSMENT — PATIENT HEALTH QUESTIONNAIRE - PHQ9
1. LITTLE INTEREST OR PLEASURE IN DOING THINGS: 0
SUM OF ALL RESPONSES TO PHQ QUESTIONS 1-9: 1
2. FEELING DOWN, DEPRESSED OR HOPELESS: 1
SUM OF ALL RESPONSES TO PHQ9 QUESTIONS 1 & 2: 1

## 2020-12-01 NOTE — PROGRESS NOTES
Chief Complaint   Patient presents with    Annual Exam     Here today for annual exam. Nataliia Sherwood form. SUBJECTIVE     Shaan Harman is a 38 y. o.female    Pt presents for annual wellness physical exam.        Pt stable since last visit- no newproblems for diagnoses listed below:  Patient Active Problem List   Diagnosis    Anxiety    Uncomplicated asthma     Doing well. Denies complaint today. She states that her chronic conditions are stable. Has only  Used 2 klonopin over the last year for anxiety. Asthma stable and she has not needed her albuterol inhaler either. She remains active with playing with her kids outside for exercise. Needs wellness form completed today. GYN care with Tamela Welsh. Nonsmoker. Body mass index is 22.63 kg/m². She denies any changes in her family history. Review of Systems   Constitutional: Negative for fatigue, fever and unexpected weight change. HENT: Negative. Respiratory: Negative for cough, shortness of breath and wheezing. Cardiovascular: Negative for chest pain. Gastrointestinal: Negative for abdominal pain and blood in stool. Genitourinary: Negative for hematuria. Musculoskeletal: Negative. Neurological: Negative. All other systems reviewed and are negative. OBJECTIVE     /76 (Site: Left Upper Arm)   Pulse 64   Temp 96.7 °F (35.9 °C)   Resp 16   Ht 5' 7.13\" (1.705 m)   Wt 145 lb (65.8 kg)   BMI 22.63 kg/m²     Wt Readings from Last 3 Encounters:   12/01/20 145 lb (65.8 kg)   04/29/19 144 lb 6.4 oz (65.5 kg)   04/09/18 150 lb (68 kg)       Physical Exam  Vitals signs and nursing note reviewed. Constitutional:       General: She is not in acute distress. Appearance: She is well-developed. HENT:      Head: Normocephalic and atraumatic.       Right Ear: Tympanic membrane normal.      Left Ear: Tympanic membrane normal.      Mouth/Throat:      Mouth: Mucous membranes are moist.      Pharynx: No posterior vaccine  Aged Out    Meningococcal (ACWY) vaccine  Aged Out    Pneumococcal 0-64 years Vaccine  Aged Out    Varicella vaccine  Discontinued         ASSESSMENT      1. Annual physical exam    2. Need for influenza vaccination        PLAN        Orders Placed This Encounter   Procedures    INFLUENZA, QUADV, 3 YRS AND OLDER, IM PF, PREFILL SYR OR SDV, 0.5ML (AFLURIA QUADV, PF)    POCT glycosylated hemoglobin (Hb A1C)     Flu shot today    Will await lab results that were done this morning and complete her wellness form at that time. Regular exercise encouraged. Media Socks received counseling on the following healthy behaviors: exercise    Patient given educational materials on wellness for age.     Follow up yearly and prn        Electronically signed by Catarino Daniels MD on 12/1/2020 at 12:25 PM

## 2020-12-01 NOTE — TELEPHONE ENCOUNTER
Pt requesting fasting labs for physical appt today at 11:00am. Children were seen today and she is taking them to school and coming back to the office at 11:00am. She is fasting and would like to have these drawn prior to coming in     Will get done at [facility]

## 2020-12-01 NOTE — PROGRESS NOTES
Immunizations Administered     Name Date Dose Route    Influenza, Quadv, IM, PF (6 mo and older Fluzone, Flulaval, Fluarix, and 3 yrs and older Afluria) 12/1/2020 0.5 mL Intramuscular    Site: Deltoid- Left    Lot: [id number]    NDC: 97411-403-36          Vaccine Information Sheet, \"Influenza - Inactivated\"  given to Taylor Crowe, or parent/legal guardian of  Taylor Crowe and verbalized understanding. Patient responses:    Have you ever had a reaction to a flu vaccine? No  Do you have an allergy to eggs, Latex -induced anaphylaxis, neomycin or polymixin? No  Do you have an allergy to Thimerosal, contact lens solution, or Merthiolate? No  Have you ever had [place] Syndrome? No  Do you have any current illness? No  Do you have a temperature above 100.4 degrees? No  Are you pregnant? No  If pregnant, permission obtained from physician? No  Do you have an active neurological disorder? No      Flu vaccine given per order. Please see immunization tab.

## 2020-12-02 ENCOUNTER — TELEPHONE (OUTPATIENT)
Dept: FAMILY MEDICINE CLINIC | Age: 35
End: 2020-12-02

## 2020-12-02 NOTE — TELEPHONE ENCOUNTER
Spoke to pt about results. Pt states she has a family history of cholesterol issues. She wants to know if she should add a fish oil supplement. Please advise.

## 2021-02-15 ENCOUNTER — APPOINTMENT (OUTPATIENT)
Dept: GENERAL RADIOLOGY | Age: 36
End: 2021-02-15
Payer: COMMERCIAL

## 2021-02-15 ENCOUNTER — PATIENT MESSAGE (OUTPATIENT)
Dept: FAMILY MEDICINE CLINIC | Age: 36
End: 2021-02-15

## 2021-02-15 ENCOUNTER — HOSPITAL ENCOUNTER (EMERGENCY)
Age: 36
Discharge: HOME | End: 2021-02-15
Attending: EMERGENCY MEDICINE
Payer: COMMERCIAL

## 2021-02-15 ENCOUNTER — APPOINTMENT (OUTPATIENT)
Dept: CT IMAGING | Age: 36
End: 2021-02-15
Payer: COMMERCIAL

## 2021-02-15 VITALS
HEART RATE: 91 BPM | SYSTOLIC BLOOD PRESSURE: 127 MMHG | OXYGEN SATURATION: 99 % | TEMPERATURE: 98.5 F | DIASTOLIC BLOOD PRESSURE: 83 MMHG | RESPIRATION RATE: 16 BRPM | HEIGHT: 67 IN | WEIGHT: 145 LBS | BODY MASS INDEX: 22.76 KG/M2

## 2021-02-15 DIAGNOSIS — R51.9 ACUTE NONINTRACTABLE HEADACHE, UNSPECIFIED HEADACHE TYPE: Primary | ICD-10-CM

## 2021-02-15 LAB
ANION GAP SERPL CALCULATED.3IONS-SCNC: 9 MEQ/L (ref 8–16)
BASOPHILS # BLD: 0.5 %
BASOPHILS ABSOLUTE: 0 THOU/MM3 (ref 0–0.1)
BUN BLDV-MCNC: 11 MG/DL (ref 7–22)
CALCIUM SERPL-MCNC: 9.6 MG/DL (ref 8.5–10.5)
CHLORIDE BLD-SCNC: 102 MEQ/L (ref 98–111)
CO2: 27 MEQ/L (ref 23–33)
CREAT SERPL-MCNC: 0.8 MG/DL (ref 0.4–1.2)
EOSINOPHIL # BLD: 2.6 %
EOSINOPHILS ABSOLUTE: 0.2 THOU/MM3 (ref 0–0.4)
ERYTHROCYTE [DISTWIDTH] IN BLOOD BY AUTOMATED COUNT: 11.9 % (ref 11.5–14.5)
ERYTHROCYTE [DISTWIDTH] IN BLOOD BY AUTOMATED COUNT: 41.3 FL (ref 35–45)
GFR SERPL CREATININE-BSD FRML MDRD: 81 ML/MIN/1.73M2
GLUCOSE BLD-MCNC: 90 MG/DL (ref 70–108)
HCT VFR BLD CALC: 45.1 % (ref 37–47)
HEMOGLOBIN: 15 GM/DL (ref 12–16)
IMMATURE GRANS (ABS): 0.01 THOU/MM3 (ref 0–0.07)
IMMATURE GRANULOCYTES: 0.2 %
LYMPHOCYTES # BLD: 25.6 %
LYMPHOCYTES ABSOLUTE: 1.7 THOU/MM3 (ref 1–4.8)
MCH RBC QN AUTO: 31.4 PG (ref 26–33)
MCHC RBC AUTO-ENTMCNC: 33.3 GM/DL (ref 32.2–35.5)
MCV RBC AUTO: 94.5 FL (ref 81–99)
MONOCYTES # BLD: 9.6 %
MONOCYTES ABSOLUTE: 0.6 THOU/MM3 (ref 0.4–1.3)
NUCLEATED RED BLOOD CELLS: 1 /100 WBC
OSMOLALITY CALCULATION: 274.6 MOSMOL/KG (ref 275–300)
PLATELET # BLD: 161 THOU/MM3 (ref 130–400)
PMV BLD AUTO: 10.2 FL (ref 9.4–12.4)
POTASSIUM REFLEX MAGNESIUM: 4.4 MEQ/L (ref 3.5–5.2)
PREGNANCY, SERUM: NEGATIVE
RBC # BLD: 4.77 MILL/MM3 (ref 4.2–5.4)
SARS-COV-2, NAAT: NOT DETECTED
SEG NEUTROPHILS: 61.5 %
SEGMENTED NEUTROPHILS ABSOLUTE COUNT: 4.1 THOU/MM3 (ref 1.8–7.7)
SODIUM BLD-SCNC: 138 MEQ/L (ref 135–145)
TROPONIN T: < 0.01 NG/ML
WBC # BLD: 6.6 THOU/MM3 (ref 4.8–10.8)

## 2021-02-15 PROCEDURE — 84484 ASSAY OF TROPONIN QUANT: CPT

## 2021-02-15 PROCEDURE — 6360000002 HC RX W HCPCS: Performed by: EMERGENCY MEDICINE

## 2021-02-15 PROCEDURE — U0002 COVID-19 LAB TEST NON-CDC: HCPCS

## 2021-02-15 PROCEDURE — 70450 CT HEAD/BRAIN W/O DYE: CPT

## 2021-02-15 PROCEDURE — 96375 TX/PRO/DX INJ NEW DRUG ADDON: CPT

## 2021-02-15 PROCEDURE — 96374 THER/PROPH/DIAG INJ IV PUSH: CPT

## 2021-02-15 PROCEDURE — 80048 BASIC METABOLIC PNL TOTAL CA: CPT

## 2021-02-15 PROCEDURE — 36415 COLL VENOUS BLD VENIPUNCTURE: CPT

## 2021-02-15 PROCEDURE — 71046 X-RAY EXAM CHEST 2 VIEWS: CPT

## 2021-02-15 PROCEDURE — 84703 CHORIONIC GONADOTROPIN ASSAY: CPT

## 2021-02-15 PROCEDURE — 2580000003 HC RX 258: Performed by: EMERGENCY MEDICINE

## 2021-02-15 PROCEDURE — 99284 EMERGENCY DEPT VISIT MOD MDM: CPT

## 2021-02-15 PROCEDURE — 85025 COMPLETE CBC W/AUTO DIFF WBC: CPT

## 2021-02-15 RX ORDER — 0.9 % SODIUM CHLORIDE 0.9 %
1000 INTRAVENOUS SOLUTION INTRAVENOUS ONCE
Status: COMPLETED | OUTPATIENT
Start: 2021-02-15 | End: 2021-02-15

## 2021-02-15 RX ORDER — DIPHENHYDRAMINE HYDROCHLORIDE 50 MG/ML
25 INJECTION INTRAMUSCULAR; INTRAVENOUS ONCE
Status: COMPLETED | OUTPATIENT
Start: 2021-02-15 | End: 2021-02-15

## 2021-02-15 RX ORDER — KETOROLAC TROMETHAMINE 30 MG/ML
15 INJECTION, SOLUTION INTRAMUSCULAR; INTRAVENOUS ONCE
Status: COMPLETED | OUTPATIENT
Start: 2021-02-15 | End: 2021-02-15

## 2021-02-15 RX ORDER — METOCLOPRAMIDE HYDROCHLORIDE 5 MG/ML
10 INJECTION INTRAMUSCULAR; INTRAVENOUS ONCE
Status: COMPLETED | OUTPATIENT
Start: 2021-02-15 | End: 2021-02-15

## 2021-02-15 RX ORDER — AMOXICILLIN AND CLAVULANATE POTASSIUM 875; 125 MG/1; MG/1
1 TABLET, FILM COATED ORAL 2 TIMES DAILY
Qty: 14 TABLET | Refills: 0 | Status: SHIPPED | OUTPATIENT
Start: 2021-02-15 | End: 2021-02-22

## 2021-02-15 RX ADMIN — METOCLOPRAMIDE 10 MG: 5 INJECTION, SOLUTION INTRAMUSCULAR; INTRAVENOUS at 18:14

## 2021-02-15 RX ADMIN — DIPHENHYDRAMINE HYDROCHLORIDE 25 MG: 50 INJECTION, SOLUTION INTRAMUSCULAR; INTRAVENOUS at 18:13

## 2021-02-15 RX ADMIN — SODIUM CHLORIDE 1000 ML: 9 INJECTION, SOLUTION INTRAVENOUS at 18:13

## 2021-02-15 RX ADMIN — KETOROLAC TROMETHAMINE 15 MG: 30 INJECTION, SOLUTION INTRAMUSCULAR; INTRAVENOUS at 18:15

## 2021-02-15 ASSESSMENT — ENCOUNTER SYMPTOMS
SHORTNESS OF BREATH: 0
ABDOMINAL PAIN: 0
RHINORRHEA: 1
VOMITING: 0
SINUS PRESSURE: 1
DIARRHEA: 0
COUGH: 1
CONSTIPATION: 0
NAUSEA: 0
BLOOD IN STOOL: 0
SORE THROAT: 0
SINUS PAIN: 1

## 2021-02-15 ASSESSMENT — PAIN SCALES - GENERAL: PAINLEVEL_OUTOF10: 5

## 2021-02-15 ASSESSMENT — PAIN DESCRIPTION - PAIN TYPE: TYPE: ACUTE PAIN

## 2021-02-15 ASSESSMENT — PAIN DESCRIPTION - LOCATION: LOCATION: HEAD

## 2021-02-15 NOTE — ED TRIAGE NOTES
Presents to ED with c/o cough, headache, and hypertension. Cough started Thursday. Headache started today. Patient bought a bp machine and it read \"high\" multiple times. Patient took ibuprofen at 0600 this morning and took tylenol around 1000 today.

## 2021-02-15 NOTE — ACP (ADVANCE CARE PLANNING)
Advance Care Planning     Advance Care Planning Activator (Inpatient)  Conversation Note      Date of ACP Conversation: 2/15/2021    Conversation Conducted with: Patient with Decision Making Capacity    ACP Activator: Lula Lua RN, BSN, [address] Maker:     Current Designated Health Care Decision Maker:     Primary Decision Maker: Aimee Gamboa - [phone]    Secondary Decision Maker: Aida Dumont - Tiburcio  [phone]    Today we documented Decision Maker(s) consistent with Legal Next of Kin hierarchy. Care Preferences    Ventilation: \"If you were in your present state of health and suddenly became very ill and were unable to breathe on your own, what would your preference be about the use of a ventilator (breathing machine) if it were available to you? \"      Would the patient desire the use of ventilator (breathing machine)?: yes    \"If your health worsens and it becomes clear that your chance of recovery is unlikely, what would your preference be about the use of a ventilator (breathing machine) if it were available to you? \"     Would the patient desire the use of ventilator (breathing machine)?: No      Resuscitation  \"CPR works best to restart the heart when there is a sudden event, like a heart attack, in someone who is otherwise healthy. Unfortunately, CPR does not typically restart the heart for people who have serious health conditions or who are very sick. \"    \"In the event your heart stopped as a result of an underlying serious health condition, would you want attempts to be made to restart your heart (answer \"yes\" for attempt to resuscitate) or would you prefer a natural death (answer \"no\" for do not attempt to resuscitate)? \" yes       [x] Yes   [] No   Educated Patient / Jeny Pérez regarding differences between Advance Directives and portable DNR orders.     Length of ACP Conversation in minutes:  15  Conversation Outcomes:  [x] ACP discussion
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
## 2021-02-15 NOTE — TELEPHONE ENCOUNTER
From: Gurmeet Mcneill  To: Estefani Bourne MD  Sent: 2/15/2021 12:44 PM EST  Subject: Non-Urgent Medical Question    I got a Covid test on Friday and it was negative. Had a fever and head cold. Over the weekend it has dropped into my chest and I have a cough. I have had a severe headache since 6am this morning. If I move my head at all it throbs. I had my  get a blood pressure machine and I have included my readings on here.  Should I go to the ER?

## 2021-02-15 NOTE — TELEPHONE ENCOUNTER
With a diastolic BP over 121, she should head in to ER for evaluation if she is symptomatic with severe headache.  AYALA

## 2021-02-16 NOTE — ED NOTES
ED nurse-to-nurse bedside report    Chief Complaint   Patient presents with    Cough    Headache    Hypertension      LOC: alert and orientated to name, place, date  Vital signs   Vitals:    02/15/21 1700 02/15/21 1707 02/15/21 1727 02/15/21 1830   BP:  (!) 146/104 (!) 138/109 127/83   Pulse: 91  91    Resp: 16  16    Temp: 98.5 °F (36.9 °C)      TempSrc: Oral      SpO2: 100% 98% 98% 99%   Weight: 145 lb (65.8 kg)      Height: 5' 7\" (1.702 m)         Pain:    Pain Interventions: none  Pain Goal: 0  Oxygen: No    Current needs required none   Telemetry: No  LDAs:   Peripheral IV 02/15/21 Left Antecubital (Active)   Site Assessment Clean;Dry; Intact 02/15/21 1813   Dressing Status Clean;Dry; Intact 02/15/21 1813     Continuous Infusions:   Mobility: Independent  Lopez Fall Risk Score: No flowsheet data found.   Report given to: Rj Rivera RN  02/15/21 6925

## 2021-02-18 ENCOUNTER — TELEPHONE (OUTPATIENT)
Dept: FAMILY MEDICINE CLINIC | Age: 36
End: 2021-02-18

## 2021-11-30 ENCOUNTER — TELEPHONE (OUTPATIENT)
Dept: FAMILY MEDICINE CLINIC | Age: 36
End: 2021-11-30

## 2021-11-30 DIAGNOSIS — Z00.00 ANNUAL PHYSICAL EXAM: Primary | ICD-10-CM

## 2021-11-30 NOTE — TELEPHONE ENCOUNTER
Please refer to patient message (11/29/21) under spouse's chart (Tamela Regan [birth year]). CG has given ok to order lipids/glucose/A1C for upcoming PE scheduled on 12/2/21. Patient will go to [facility]--orders placed in Epic.

## 2021-12-02 ENCOUNTER — OFFICE VISIT (OUTPATIENT)
Dept: FAMILY MEDICINE CLINIC | Age: 36
End: 2021-12-02
Payer: COMMERCIAL

## 2021-12-02 ENCOUNTER — TELEPHONE (OUTPATIENT)
Dept: FAMILY MEDICINE CLINIC | Age: 36
End: 2021-12-02

## 2021-12-02 ENCOUNTER — NURSE ONLY (OUTPATIENT)
Dept: LAB | Age: 36
End: 2021-12-02

## 2021-12-02 VITALS
HEIGHT: 66 IN | HEART RATE: 52 BPM | TEMPERATURE: 98.3 F | WEIGHT: 149 LBS | RESPIRATION RATE: 16 BRPM | SYSTOLIC BLOOD PRESSURE: 116 MMHG | DIASTOLIC BLOOD PRESSURE: 76 MMHG | BODY MASS INDEX: 23.95 KG/M2

## 2021-12-02 DIAGNOSIS — J45.909 UNCOMPLICATED ASTHMA, UNSPECIFIED ASTHMA SEVERITY, UNSPECIFIED WHETHER PERSISTENT: ICD-10-CM

## 2021-12-02 DIAGNOSIS — Z00.00 ANNUAL PHYSICAL EXAM: ICD-10-CM

## 2021-12-02 DIAGNOSIS — R10.11 RIGHT UPPER QUADRANT ABDOMINAL PAIN: ICD-10-CM

## 2021-12-02 DIAGNOSIS — Z13.1 SCREENING FOR DIABETES MELLITUS: ICD-10-CM

## 2021-12-02 DIAGNOSIS — Z13.1 DIABETES MELLITUS SCREENING: ICD-10-CM

## 2021-12-02 DIAGNOSIS — Z11.59 NEED FOR HEPATITIS C SCREENING TEST: ICD-10-CM

## 2021-12-02 DIAGNOSIS — Z00.00 ANNUAL PHYSICAL EXAM: Primary | ICD-10-CM

## 2021-12-02 DIAGNOSIS — Z13.1 DIABETES MELLITUS SCREENING: Primary | ICD-10-CM

## 2021-12-02 LAB
ALBUMIN SERPL-MCNC: 4.7 G/DL (ref 3.5–5.1)
ALP BLD-CCNC: 53 U/L (ref 38–126)
ALT SERPL-CCNC: 10 U/L (ref 11–66)
ANION GAP SERPL CALCULATED.3IONS-SCNC: 14 MEQ/L (ref 8–16)
AST SERPL-CCNC: 14 U/L (ref 5–40)
AVERAGE GLUCOSE: 96 MG/DL (ref 70–126)
BILIRUB SERPL-MCNC: 0.3 MG/DL (ref 0.3–1.2)
BILIRUBIN URINE: NEGATIVE
BLOOD URINE, POC: NEGATIVE
BUN BLDV-MCNC: 14 MG/DL (ref 7–22)
CALCIUM SERPL-MCNC: 9.4 MG/DL (ref 8.5–10.5)
CHARACTER, URINE: CLEAR
CHLORIDE BLD-SCNC: 106 MEQ/L (ref 98–111)
CHOLESTEROL, TOTAL: 243 MG/DL (ref 100–199)
CO2: 22 MEQ/L (ref 23–33)
COLOR, URINE: YELLOW
CREAT SERPL-MCNC: 0.7 MG/DL (ref 0.4–1.2)
GFR SERPL CREATININE-BSD FRML MDRD: > 90 ML/MIN/1.73M2
GLUCOSE BLD-MCNC: 82 MG/DL (ref 70–108)
GLUCOSE URINE: NEGATIVE MG/DL
HBA1C MFR BLD: 5.2 % (ref 4.4–6.4)
HDLC SERPL-MCNC: 60 MG/DL
KETONES, URINE: NEGATIVE
LDL CHOLESTEROL CALCULATED: 159 MG/DL
LEUKOCYTE CLUMPS, URINE: ABNORMAL
NITRITE, URINE: NEGATIVE
PH, URINE: 6.5 (ref 5–9)
POTASSIUM SERPL-SCNC: 4.4 MEQ/L (ref 3.5–5.2)
PROTEIN, URINE: NEGATIVE MG/DL
SODIUM BLD-SCNC: 142 MEQ/L (ref 135–145)
SPECIFIC GRAVITY, URINE: 1.02 (ref 1–1.03)
TOTAL PROTEIN: 6.8 G/DL (ref 6.1–8)
TRIGL SERPL-MCNC: 119 MG/DL (ref 0–199)
UROBILINOGEN, URINE: 0.2 EU/DL (ref 0–1)

## 2021-12-02 PROCEDURE — 81003 URINALYSIS AUTO W/O SCOPE: CPT | Performed by: NURSE PRACTITIONER

## 2021-12-02 PROCEDURE — 99395 PREV VISIT EST AGE 18-39: CPT | Performed by: NURSE PRACTITIONER

## 2021-12-02 RX ORDER — ALBUTEROL SULFATE 90 UG/1
2 AEROSOL, METERED RESPIRATORY (INHALATION) EVERY 6 HOURS PRN
Qty: 1 EACH | Refills: 3 | Status: SHIPPED | OUTPATIENT
Start: 2021-12-02 | End: 2022-07-29 | Stop reason: SDUPTHER

## 2021-12-02 ASSESSMENT — ENCOUNTER SYMPTOMS
DIARRHEA: 0
BLOOD IN STOOL: 0
VOMITING: 0
SHORTNESS OF BREATH: 0
NAUSEA: 0
CONSTIPATION: 0
ABDOMINAL PAIN: 1

## 2021-12-02 ASSESSMENT — PATIENT HEALTH QUESTIONNAIRE - PHQ9
1. LITTLE INTEREST OR PLEASURE IN DOING THINGS: 0
SUM OF ALL RESPONSES TO PHQ QUESTIONS 1-9: 0
2. FEELING DOWN, DEPRESSED OR HOPELESS: 0
SUM OF ALL RESPONSES TO PHQ9 QUESTIONS 1 & 2: 0
SUM OF ALL RESPONSES TO PHQ QUESTIONS 1-9: 0
SUM OF ALL RESPONSES TO PHQ QUESTIONS 1-9: 0

## 2021-12-02 NOTE — PROGRESS NOTES
Chief Complaint   Patient presents with    Annual Exam     No concerns. SUBJECTIVE     Dayne Rosales is a 39 y. o.female    Pt presents for annual wellness physical exam.        Pt stable since last visit- no new problems for diagnoses listed below:  Patient Active Problem List   Diagnosis    Anxiety    Uncomplicated asthma     Patient reports she will have occasional sharp pains when she lays down in the upper right abd under rib. This has been going on and off for last few months. Is not consistent. Some nausea with it but she feels like this is due to the pain. She will take ibuprofen and reports mild relief. Positioning sometimes helps. History of kidney stones but this does not feel the same. Review of Systems   Constitutional: Negative for chills, diaphoresis and fever. Respiratory: Negative for shortness of breath. Cardiovascular: Negative for chest pain, palpitations and leg swelling. Gastrointestinal: Positive for abdominal pain (right upper under rib). Negative for blood in stool, constipation, diarrhea, nausea and vomiting. Genitourinary: Negative for dysuria and hematuria. Musculoskeletal: Negative for myalgias. Neurological: Negative for dizziness and headaches. All other systems reviewed and are negative. OBJECTIVE     /76   Pulse 52   Temp 98.3 °F (36.8 °C) (Oral)   Resp 16   Ht 5' 6\" (1.676 m)   Wt 149 lb (67.6 kg)   BMI 24.05 kg/m²     Wt Readings from Last 3 Encounters:   12/02/21 149 lb (67.6 kg)   02/15/21 145 lb (65.8 kg)   12/01/20 145 lb (65.8 kg)       Physical Exam  Vitals and nursing note reviewed. Constitutional:       Appearance: She is well-developed. HENT:      Head: Normocephalic and atraumatic. Right Ear: External ear normal.      Left Ear: External ear normal.      Nose: Nose normal.   Eyes:      Conjunctiva/sclera: Conjunctivae normal.      Pupils: Pupils are equal, round, and reactive to light.    Cardiovascular:      Rate and Rhythm: Normal rate and regular rhythm. Heart sounds: Normal heart sounds. Pulmonary:      Effort: Pulmonary effort is normal.      Breath sounds: Normal breath sounds. Abdominal:      General: Bowel sounds are normal.      Palpations: Abdomen is soft. Tenderness: There is abdominal tenderness in the right upper quadrant and periumbilical area. Musculoskeletal:         General: Normal range of motion. Cervical back: Normal range of motion and neck supple. Skin:     General: Skin is warm and dry. Neurological:      Mental Status: She is alert and oriented to person, place, and time. Deep Tendon Reflexes: Reflexes are normal and symmetric. Psychiatric:         Behavior: Behavior normal.         Thought Content:  Thought content normal.         Judgment: Judgment normal.         Immunization History   Administered Date(s) Administered    COVID-19, Moderna, Primary or Immunocompromised, PF, 100mcg/0.5mL 04/09/2021, 05/11/2021    Hepatitis A Adult (Havrix, Vaqta) 03/08/2019, 06/16/2020    Hepatitis B 08/16/2003    Hepatitis B (Engerix-B) 10/11/2016, 12/30/2016    Hepatitis B Ped/Adol (Engerix-B, Recombivax HB) 07/19/2004    Influenza Virus Vaccine 08/20/2014, 08/14/2018    Influenza Whole 09/16/2010    Influenza, MDCK Quadv, IM, PF (Flucelvax 2 yrs and older) 08/15/2019, 08/27/2021    Influenza, Quadv, IM, PF (6 mo and older Fluzone, Flulaval, Fluarix, and 3 yrs and older Afluria) 12/01/2020    Pneumococcal Conjugate 7-valent (Prevnar7) 06/20/2011    Tdap (Boostrix, Adacel) 08/20/2014    Tetanus 08/16/2008         Health Maintenance   Topic Date Due    Hepatitis C screen  Never done    Pneumococcal 0-64 years Vaccine (1 of 2 - PPSV23) 10/08/1990    Cervical cancer screen  Never done    DTaP/Tdap/Td vaccine (2 - Td or Tdap) 08/20/2024    Hepatitis B vaccine  Completed    Flu vaccine  Completed    COVID-19 Vaccine  Completed    HIV screen  Completed    Hepatitis A vaccine  Aged Out    Hib vaccine  Aged Out    Meningococcal (ACWY) vaccine  Aged Out    Varicella vaccine  Discontinued         ASSESSMENT       Diagnosis Orders   1. Annual physical exam     2. Screening for diabetes mellitus     3. Uncomplicated asthma, unspecified asthma severity, unspecified whether persistent  albuterol sulfate HFA (PROAIR HFA) 108 (90 Base) MCG/ACT inhaler   4. Need for hepatitis C screening test  Hepatitis C Antibody   5. Right upper quadrant abdominal pain  POCT Urinalysis No Micro (Auto)       PLAN        Orders Placed This Encounter   Procedures    Hepatitis C Antibody     Standing Status:   Future     Standing Expiration Date:   12/2/2022    POCT Urinalysis No Micro (Auto)       Requested Prescriptions     Signed Prescriptions Disp Refills    albuterol sulfate HFA (PROAIR HFA) 108 (90 Base) MCG/ACT inhaler 1 each 3     Sig: Inhale 2 puffs into the lungs every 6 hours as needed for Wheezing       Flu vaccine is UTD  Pap/pelvic management per Dr Nataliya Garcia - pending at this time  Urine today is normal  Discussed ultrasound of gallbladder.   Patient would like to wait and see what labs look like first.  Continue current medications  Refills sent  Follow up annually and as needed      Electronically signed by TR Contreras CNP on 12/2/2021 at 2:59 PM

## 2021-12-02 NOTE — PATIENT INSTRUCTIONS
Patient Education        Well Visit, Ages 25 to 48: Care Instructions  Overview     Well visits can help you stay healthy. Your doctor has checked your overall health and may have suggested ways to take good care of yourself. Your doctor also may have recommended tests. At home, you can help prevent illness with healthy eating, regular exercise, and other steps. Follow-up care is a key part of your treatment and safety. Be sure to make and go to all appointments, and call your doctor if you are having problems. It's also a good idea to know your test results and keep a list of the medicines you take. How can you care for yourself at home? · Get screening tests that you and your doctor decide on. Screening helps find diseases before any symptoms appear. · Eat healthy foods. Choose fruits, vegetables, whole grains, protein, and low-fat dairy foods. Limit fat, especially saturated fat. Reduce salt in your diet. · Limit alcohol. If you are a man, have no more than 2 drinks a day or 14 drinks a week. If you are a woman, have no more than 1 drink a day or 7 drinks a week. · Get at least 30 minutes of physical activity on most days of the week. Walking is a good choice. You also may want to do other activities, such as running, swimming, cycling, or playing tennis or team sports. Discuss any changes in your exercise program with your doctor. · Reach and stay at a healthy weight. This will lower your risk for many problems, such as obesity, diabetes, heart disease, and high blood pressure. · Do not smoke or allow others to smoke around you. If you need help quitting, talk to your doctor about stop-smoking programs and medicines. These can increase your chances of quitting for good. · Care for your mental health. It is easy to get weighed down by worry and stress. Learn strategies to manage stress, like deep breathing and mindfulness, and stay connected with your family and community.  If you find you often feel sad or hopeless, talk with your doctor. Treatment can help. · Talk to your doctor about whether you have any risk factors for sexually transmitted infections (STIs). You can help prevent STIs if you wait to have sex with a new partner (or partners) until you've each been tested for STIs. It also helps if you use condoms (male or female condoms) and if you limit your sex partners to one person who only has sex with you. Vaccines are available for some STIs, such as HPV. · Use birth control if it's important to you to prevent pregnancy. Talk with your doctor about the choices available and what might be best for you. · If you think you may have a problem with alcohol or drug use, talk to your doctor. This includes prescription medicines (such as amphetamines and opioids) and illegal drugs (such as cocaine and methamphetamine). Your doctor can help you figure out what type of treatment is best for you. · Protect your skin from too much sun. When you're outdoors from 10 a.m. to 4 p.m., stay in the shade or cover up with clothing and a hat with a wide brim. Wear sunglasses that block UV rays. Even when it's cloudy, put broad-spectrum sunscreen (SPF 30 or higher) on any exposed skin. · See a dentist one or two times a year for checkups and to have your teeth cleaned. · Wear a seat belt in the car. When should you call for help? Watch closely for changes in your health, and be sure to contact your doctor if you have any problems or symptoms that concern you. Where can you learn more? Go to https://[employer]. org and sign in to your [employer] account. Enter P072 in the Actimize box to learn more about \"Well Visit, Ages 25 to 48: Care Instructions. \"     If you do not have an account, please click on the \"Sign Up Now\" link. Current as of: February 11, 2021               Content Version: 13.0  © 1076-8891 Healthwise, Incorporated.    Care instructions adapted under license by [facility]. If you have questions about a medical condition or this instruction, always ask your healthcare professional. [place] [address] any warranty or liability for your use of this information.

## 2022-07-27 ENCOUNTER — NURSE ONLY (OUTPATIENT)
Dept: LAB | Age: 37
End: 2022-07-27

## 2022-07-27 DIAGNOSIS — Z11.59 NEED FOR HEPATITIS C SCREENING TEST: ICD-10-CM

## 2022-07-27 DIAGNOSIS — R06.02 SOB (SHORTNESS OF BREATH): ICD-10-CM

## 2022-07-27 DIAGNOSIS — R60.0 LEG EDEMA: ICD-10-CM

## 2022-07-27 LAB
ANION GAP SERPL CALCULATED.3IONS-SCNC: 11 MEQ/L (ref 8–16)
BACTERIA: NORMAL
BASOPHILS # BLD: 0.6 %
BASOPHILS ABSOLUTE: 0 THOU/MM3 (ref 0–0.1)
BILIRUBIN URINE: NEGATIVE
BLOOD, URINE: NEGATIVE
BUN BLDV-MCNC: 13 MG/DL (ref 7–22)
CALCIUM SERPL-MCNC: 9.9 MG/DL (ref 8.5–10.5)
CASTS: NORMAL /LPF
CASTS: NORMAL /LPF
CHARACTER, URINE: CLEAR
CHLORIDE BLD-SCNC: 104 MEQ/L (ref 98–111)
CO2: 26 MEQ/L (ref 23–33)
COLOR: YELLOW
CREAT SERPL-MCNC: 0.7 MG/DL (ref 0.4–1.2)
CRYSTALS: NORMAL
EOSINOPHIL # BLD: 3.6 %
EOSINOPHILS ABSOLUTE: 0.2 THOU/MM3 (ref 0–0.4)
EPITHELIAL CELLS, UA: NORMAL /HPF
ERYTHROCYTE [DISTWIDTH] IN BLOOD BY AUTOMATED COUNT: 12.8 % (ref 11.5–14.5)
ERYTHROCYTE [DISTWIDTH] IN BLOOD BY AUTOMATED COUNT: 45.4 FL (ref 35–45)
GFR SERPL CREATININE-BSD FRML MDRD: > 90 ML/MIN/1.73M2
GLUCOSE BLD-MCNC: 92 MG/DL (ref 70–108)
GLUCOSE, URINE: NEGATIVE MG/DL
HCT VFR BLD CALC: 44.4 % (ref 37–47)
HEMOGLOBIN: 14.2 GM/DL (ref 12–16)
IMMATURE GRANS (ABS): 0.01 THOU/MM3 (ref 0–0.07)
IMMATURE GRANULOCYTES: 0.2 %
KETONES, URINE: NEGATIVE
LEUKOCYTE ESTERASE, URINE: NEGATIVE
LYMPHOCYTES # BLD: 23.9 %
LYMPHOCYTES ABSOLUTE: 1.5 THOU/MM3 (ref 1–4.8)
MCH RBC QN AUTO: 30.9 PG (ref 26–33)
MCHC RBC AUTO-ENTMCNC: 32 GM/DL (ref 32.2–35.5)
MCV RBC AUTO: 96.7 FL (ref 81–99)
MISCELLANEOUS LAB TEST RESULT: NORMAL
MONOCYTES # BLD: 8.1 %
MONOCYTES ABSOLUTE: 0.5 THOU/MM3 (ref 0.4–1.3)
NITRITE, URINE: NEGATIVE
NUCLEATED RED BLOOD CELLS: 0 /100 WBC
PH UA: 6.5 (ref 5–9)
PLATELET # BLD: 192 THOU/MM3 (ref 130–400)
PMV BLD AUTO: 11.5 FL (ref 9.4–12.4)
POTASSIUM SERPL-SCNC: 4.3 MEQ/L (ref 3.5–5.2)
PROTEIN UA: NEGATIVE MG/DL
RBC # BLD: 4.59 MILL/MM3 (ref 4.2–5.4)
RBC URINE: NORMAL /HPF
RENAL EPITHELIAL, UA: NORMAL
SEG NEUTROPHILS: 63.6 %
SEGMENTED NEUTROPHILS ABSOLUTE COUNT: 4 THOU/MM3 (ref 1.8–7.7)
SODIUM BLD-SCNC: 141 MEQ/L (ref 135–145)
SPECIFIC GRAVITY UA: 1.02 (ref 1–1.03)
UROBILINOGEN, URINE: 0.2 EU/DL (ref 0–1)
WBC # BLD: 6.3 THOU/MM3 (ref 4.8–10.8)
WBC UA: NORMAL /HPF
YEAST: NORMAL

## 2022-07-28 LAB — HEPATITIS C ANTIBODY: NEGATIVE

## 2022-07-29 ENCOUNTER — OFFICE VISIT (OUTPATIENT)
Dept: FAMILY MEDICINE CLINIC | Age: 37
End: 2022-07-29
Payer: COMMERCIAL

## 2022-07-29 VITALS
DIASTOLIC BLOOD PRESSURE: 78 MMHG | WEIGHT: 163 LBS | BODY MASS INDEX: 26.31 KG/M2 | RESPIRATION RATE: 16 BRPM | SYSTOLIC BLOOD PRESSURE: 120 MMHG | HEART RATE: 80 BPM | TEMPERATURE: 98 F

## 2022-07-29 DIAGNOSIS — J45.909 UNCOMPLICATED ASTHMA, UNSPECIFIED ASTHMA SEVERITY, UNSPECIFIED WHETHER PERSISTENT: ICD-10-CM

## 2022-07-29 DIAGNOSIS — R42 EPISODIC LIGHTHEADEDNESS: ICD-10-CM

## 2022-07-29 DIAGNOSIS — R53.83 FATIGUE, UNSPECIFIED TYPE: ICD-10-CM

## 2022-07-29 DIAGNOSIS — Z86.16 HISTORY OF COVID-19: Primary | ICD-10-CM

## 2022-07-29 DIAGNOSIS — R00.2 PALPITATION: ICD-10-CM

## 2022-07-29 PROCEDURE — 99214 OFFICE O/P EST MOD 30 MIN: CPT | Performed by: NURSE PRACTITIONER

## 2022-07-29 RX ORDER — TRIAMCINOLONE ACETONIDE 0.25 MG/G
CREAM TOPICAL
Qty: 15 G | Refills: 0 | Status: SHIPPED | OUTPATIENT
Start: 2022-07-29

## 2022-07-29 RX ORDER — ALBUTEROL SULFATE 90 UG/1
2 AEROSOL, METERED RESPIRATORY (INHALATION) EVERY 6 HOURS PRN
Qty: 1 EACH | Refills: 3 | Status: SHIPPED | OUTPATIENT
Start: 2022-07-29

## 2022-07-29 SDOH — ECONOMIC STABILITY: FOOD INSECURITY: WITHIN THE PAST 12 MONTHS, YOU WORRIED THAT YOUR FOOD WOULD RUN OUT BEFORE YOU GOT MONEY TO BUY MORE.: NEVER TRUE

## 2022-07-29 SDOH — ECONOMIC STABILITY: FOOD INSECURITY: WITHIN THE PAST 12 MONTHS, THE FOOD YOU BOUGHT JUST DIDN'T LAST AND YOU DIDN'T HAVE MONEY TO GET MORE.: NEVER TRUE

## 2022-07-29 ASSESSMENT — PATIENT HEALTH QUESTIONNAIRE - PHQ9
1. LITTLE INTEREST OR PLEASURE IN DOING THINGS: 0
2. FEELING DOWN, DEPRESSED OR HOPELESS: 0
SUM OF ALL RESPONSES TO PHQ QUESTIONS 1-9: 0
SUM OF ALL RESPONSES TO PHQ9 QUESTIONS 1 & 2: 0
SUM OF ALL RESPONSES TO PHQ QUESTIONS 1-9: 0

## 2022-07-29 ASSESSMENT — SOCIAL DETERMINANTS OF HEALTH (SDOH): HOW HARD IS IT FOR YOU TO PAY FOR THE VERY BASICS LIKE FOOD, HOUSING, MEDICAL CARE, AND HEATING?: NOT HARD AT ALL

## 2022-07-29 NOTE — PROGRESS NOTES
Chief Complaint   Patient presents with    Follow-up     Follow up from [facility] in May. Pt c/o dizziness, fatigue, SOB, and some heart palpitations. Pt has been told that she has a heart murmur in the past and a family hx of heart issues and would like to discuss. SANDY Mistry is a 39 y. o.female      Pt had covid in nov and may. She has been feeling unwell since may and c/o edema, weight gain. Sob, wheezing, fatigue, heart palpitations, racing heart, dizziness. Denies CP. She reports that she has had swelling in her arms and legs and notes that she has put on around 10 lbs. The fluid is a little better after taking lasix twice prior to labs about 2 weeks ago. She has put on some fluids since, but not as much. She is a  and has trouble running due to sob and wheezing. She does have asthma. Had previous hydronephrosis from renal stones so was concerned that her kidneys were causing her issues. Is wearing compression stockings to help. She did get labwork completed prior to her appt. Father has a-fib, so she is concerned when her heart races and has palpitations. Had an echo at 11year old and was found to have mitral valve issues and murmur. BP is normal when she checks it at home. Review of Systems   Constitutional:  Positive for fatigue. Negative for chills, diaphoresis and fever. Respiratory:  Positive for shortness of breath and wheezing. Cardiovascular:  Positive for palpitations and leg swelling. Negative for chest pain. Gastrointestinal:  Negative for blood in stool, constipation, diarrhea, nausea and vomiting. Genitourinary:  Negative for dysuria and hematuria. Musculoskeletal:  Negative for myalgias. Neurological:  Positive for dizziness. Negative for headaches. All other systems reviewed and are negative.       OBJECTIVE     /78   Pulse 80   Temp 98 °F (36.7 °C) (Oral)   Resp 16   Wt 163 lb (73.9 kg)   BMI 26.31 kg/m²   Wt Readings from Last 3 Encounters:   07/29/22 163 lb (73.9 kg)   12/02/21 149 lb (67.6 kg)   02/15/21 145 lb (65.8 kg)       Physical Exam  Vitals and nursing note reviewed. Constitutional:       Appearance: She is well-developed. HENT:      Head: Normocephalic and atraumatic. Right Ear: External ear normal.      Left Ear: External ear normal.      Nose: Nose normal.   Eyes:      Conjunctiva/sclera: Conjunctivae normal.      Pupils: Pupils are equal, round, and reactive to light. Cardiovascular:      Rate and Rhythm: Normal rate and regular rhythm. Heart sounds: Normal heart sounds. Pulmonary:      Effort: Pulmonary effort is normal.      Breath sounds: Normal breath sounds. Abdominal:      General: Bowel sounds are normal.      Palpations: Abdomen is soft. Musculoskeletal:         General: Normal range of motion. Cervical back: Normal range of motion and neck supple. Skin:     General: Skin is warm and dry. Neurological:      Mental Status: She is alert and oriented to person, place, and time. Deep Tendon Reflexes: Reflexes are normal and symmetric. Psychiatric:         Behavior: Behavior normal.         Thought Content:  Thought content normal.         Judgment: Judgment normal.       Component      Latest Ref Rng & Units 7/27/2022 7/27/2022 7/27/2022           3:28 PM  3:28 PM  3:27 PM   WBC      4.8 - 10.8 thou/mm3   6.3   RBC      4.20 - 5.40 mill/mm3   4.59   Hemoglobin Quant      12.0 - 16.0 gm/dl   14.2   Hematocrit      37.0 - 47.0 %   44.4   MCV      81.0 - 99.0 fL   96.7   MCH      26.0 - 33.0 pg   30.9   MCHC      32.2 - 35.5 gm/dl   32.0 (L)   RDW-CV      11.5 - 14.5 %   12.8   RDW-SD      35.0 - 45.0 fL   45.4 (H)   Platelet Count      929 - 400 thou/mm3   192   MPV      9.4 - 12.4 fL   11.5   Seg Neutrophils      %   63.6   Lymphocytes      %   23.9   Monocytes      %   8.1   Eosinophils      %   3.6   Basophils      %   0.6   Immature Granulocytes      %   0.2   Segs Absolute 1.8 - 7.7 thou/mm3   4.0   Lymphocytes Absolute      1.0 - 4.8 thou/mm3   1.5   Monocytes Absolute      0.4 - 1.3 thou/mm3   0.5   Eosinophils Absolute      0.0 - 0.4 thou/mm3   0.2   Basophils Absolute      0.0 - 0.1 thou/mm3   0.0   Immature Grans (Abs)      0.00 - 0.07 thou/mm3   0.01   Nucleated Red Blood Cells      /100 wbc   0   Glucose, Urine      NEGATIVE mg/dl  NEGATIVE    Bilirubin, Urine      NEGATIVE  NEGATIVE    Ketones, Urine      NEGATIVE  NEGATIVE    Specific Gravity, UA      1.002 - 1.030  1.021    Blood, Urine      NEGATIVE  NEGATIVE    pH, UA      5.0 - 9.0  6.5    Protein, UA      NEGATIVE mg/dl  NEGATIVE    Urobilinogen, Urine      0.0 - 1.0 eu/dl  0.2    Nitrite, Urine      NEGATIVE  NEGATIVE    Leukocyte Esterase, Urine      NEGATIVE  NEGATIVE    Color, UA      YELLOW-STRAW  YELLOW    Character, Urine      CLR-SL.CLOUD  CLEAR    RBC, UA      0-2/hpf /hpf  0-2    WBC, UA      0-4/hpf /hpf  2-4    Epithelial Cells, UA      3-5/hpf /hpf  0-2    Bacteria, UA      FEW/NONE SEEN  NONE SEEN    Casts      NONE SEEN /lpf NONE SEEN NONE SEEN    CRYSTALS,XTAL      NONE SEEN  NONE SEEN    Renal Epithelial, UA      NONE SEEN  NONE SEEN    Yeast, Urine      NONE SEEN  NONE SEEN    Miscellaneous Lab Test Result        NONE SEEN    Sodium      135 - 145 meq/L   141   Potassium      3.5 - 5.2 meq/L   4.3   Chloride      98 - 111 meq/L   104   CO2      23 - 33 meq/L   26   GLUCOSE, FASTING,GF      70 - 108 mg/dL   92   BUN,BUNPL      7 - 22 mg/dL   13   Creatinine      0.4 - 1.2 mg/dL   0.7   CALCIUM, SERUM, 948626      8.5 - 10.5 mg/dL   9.9   Hepatitis C Ab         Negative   Anion Gap      8.0 - 16.0 meq/L   11.0   Est, Glom Filt Rate      ml/min/1.73m2   >90     No results found for this visit on 07/29/22. ASSESSMENT       Diagnosis Orders   1. History of COVID-19  ECHO 2D WO Color Doppler Complete    Cardiac event monitor      2.  Uncomplicated asthma, unspecified asthma severity, unspecified whether persistent  albuterol sulfate HFA (PROAIR HFA) 108 (90 Base) MCG/ACT inhaler      3. Episodic lightheadedness  ECHO 2D WO Color Doppler Complete    Cardiac event monitor      4. Palpitation  ECHO 2D WO Color Doppler Complete    Cardiac event monitor      5.  Fatigue, unspecified type  ECHO 2D WO Color Doppler Complete    Cardiac event monitor          PLAN     Requested Prescriptions     Signed Prescriptions Disp Refills    albuterol sulfate HFA (PROAIR HFA) 108 (90 Base) MCG/ACT inhaler 1 each 3     Sig: Inhale 2 puffs into the lungs every 6 hours as needed for Wheezing    triamcinolone (KENALOG) 0.025 % cream 15 g 0     Sig: Apply Topically to affected area twice daily for 1 week       Refills sent  Echo and holter monitor ordered  Recent labs looked good  Follow up pending above testing        Electronically signed by TR Harrison CNP on 7/31/2022 at 8:33 PM

## 2022-07-31 ASSESSMENT — ENCOUNTER SYMPTOMS
SHORTNESS OF BREATH: 1
BLOOD IN STOOL: 0
DIARRHEA: 0
WHEEZING: 1
CONSTIPATION: 0
VOMITING: 0
NAUSEA: 0

## 2022-08-08 NOTE — PROGRESS NOTES
Per Ry ARMANDO-CNP, OK to change echo order for 8/9/22 from a limited to a full.   Patient will be notified of change upon arrival

## 2022-08-09 ENCOUNTER — HOSPITAL ENCOUNTER (OUTPATIENT)
Dept: NON INVASIVE DIAGNOSTICS | Age: 37
Discharge: HOME | End: 2022-08-09
Payer: COMMERCIAL

## 2022-08-09 DIAGNOSIS — Z86.16 HISTORY OF COVID-19: ICD-10-CM

## 2022-08-09 DIAGNOSIS — R53.83 FATIGUE, UNSPECIFIED TYPE: ICD-10-CM

## 2022-08-09 DIAGNOSIS — R42 EPISODIC LIGHTHEADEDNESS: ICD-10-CM

## 2022-08-09 DIAGNOSIS — R00.2 PALPITATION: ICD-10-CM

## 2022-08-09 LAB
LV EF: 63 %
LVEF MODALITY: NORMAL

## 2022-08-09 PROCEDURE — 93270 REMOTE 30 DAY ECG REV/REPORT: CPT

## 2022-08-09 PROCEDURE — 93306 TTE W/DOPPLER COMPLETE: CPT

## 2022-08-09 NOTE — PROCEDURES
The skin was prepped and a 7 day cardiac event monitor was applied. The patient was instructed on the documentation of symptoms and the purpose of the monitor as well as the things to avoid while wearing the monitor. The patient was instructed to remove and return the monitor on 08/16/2022.   The serial number of the monitorthat was applied is [id number]

## 2022-08-27 NOTE — PROCEDURES
[address] [place] [address]                                 EVENT MONITOR    PATIENT NAME: Britt Pickett                    :        1985  MED REC NO:   [id number]                           ROOM:  ACCOUNT NO:   [de-identified]                           ADMIT DATE: 2022  PROVIDER:     Messi Burns. Nini Bee M.D.    TEST TYPE:  Event monitor. DATE OF ENROLLMENT:  2022 to 08/15/2022. INDICATION:  Dizziness, palpitation and fatigue. FINDINGS:  The patient is in normal sinus rhythm. Captured heart rate  ranging from 42 to 175 beats per minute. Isolated ventricular ectopic  beat has been noted, otherwise there was no AV block. No pause. No  ventricular or supraventricular tachycardia. No atrial fibrillation. There was no autotriggered capture. There are two symptoms-triggered  captures. Those two symptom-triggered captures are associated with  normal sinus rhythm, normal heart rate and at one time associated with  isolated ventricular ectopic beat. As I said, no AV block. No pause. No atrial fibrillation. CONCLUSION:  This is a benign event monitor finding with normal sinus  rhythm. Captured heart rate ranging from 42 to 175 beats per minute. One isolated ventricular ectopic beat is noted. No AFib. No  supraventricular tachycardia. No ventricular tachycardia. No AV block. No pause. No significant bradyarrhythmia or tachyarrhythmia to be  addressed. Isaura Platt M.D.    D: 2022 0:24:46       T: 2022 1:40:51     [email or web address]  Job#: [id number]     Doc#: [id number]    CC:

## 2022-11-29 ENCOUNTER — NURSE ONLY (OUTPATIENT)
Dept: LAB | Age: 37
End: 2022-11-29

## 2022-11-29 DIAGNOSIS — Z00.00 ANNUAL PHYSICAL EXAM: ICD-10-CM

## 2022-11-29 LAB
CHOLESTEROL, TOTAL: 262 MG/DL (ref 100–199)
GLUCOSE FASTING: 73 MG/DL (ref 70–108)
HDLC SERPL-MCNC: 68 MG/DL
LDL CHOLESTEROL CALCULATED: 175 MG/DL
TRIGL SERPL-MCNC: 95 MG/DL (ref 0–199)

## 2022-12-01 ENCOUNTER — OFFICE VISIT (OUTPATIENT)
Dept: FAMILY MEDICINE CLINIC | Age: 37
End: 2022-12-01
Payer: COMMERCIAL

## 2022-12-01 VITALS
HEIGHT: 66 IN | WEIGHT: 144.8 LBS | BODY MASS INDEX: 23.27 KG/M2 | SYSTOLIC BLOOD PRESSURE: 128 MMHG | HEART RATE: 72 BPM | RESPIRATION RATE: 16 BRPM | DIASTOLIC BLOOD PRESSURE: 84 MMHG

## 2022-12-01 DIAGNOSIS — E78.2 MIXED HYPERLIPIDEMIA: ICD-10-CM

## 2022-12-01 DIAGNOSIS — Z00.00 ANNUAL PHYSICAL EXAM: Primary | ICD-10-CM

## 2022-12-01 LAB — HBA1C MFR BLD: 4.6 % (ref 4.3–5.7)

## 2022-12-01 PROCEDURE — 83036 HEMOGLOBIN GLYCOSYLATED A1C: CPT | Performed by: FAMILY MEDICINE

## 2022-12-01 PROCEDURE — 99395 PREV VISIT EST AGE 18-39: CPT | Performed by: FAMILY MEDICINE

## 2022-12-01 RX ORDER — ROSUVASTATIN CALCIUM 10 MG/1
10 TABLET, COATED ORAL NIGHTLY
Qty: 90 TABLET | Refills: 3 | Status: SHIPPED | OUTPATIENT
Start: 2022-12-01

## 2022-12-01 ASSESSMENT — ENCOUNTER SYMPTOMS
BLOOD IN STOOL: 0
NAUSEA: 0
SHORTNESS OF BREATH: 0
EYES NEGATIVE: 1
DIARRHEA: 0
ABDOMINAL PAIN: 0
VOMITING: 0
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
## 2023-01-11 ENCOUNTER — NURSE ONLY (OUTPATIENT)
Dept: LAB | Age: 38
End: 2023-01-11

## 2023-01-11 DIAGNOSIS — E78.2 MIXED HYPERLIPIDEMIA: ICD-10-CM

## 2023-01-11 LAB
ALT SERPL-CCNC: 12 U/L (ref 11–66)
AST SERPL-CCNC: 17 U/L (ref 5–40)
LDL CHOLESTEROL DIRECT: 85.42 MG/DL

## 2023-03-13 DIAGNOSIS — E78.2 MIXED HYPERLIPIDEMIA: ICD-10-CM

## 2023-03-13 DIAGNOSIS — J45.909 UNCOMPLICATED ASTHMA, UNSPECIFIED ASTHMA SEVERITY, UNSPECIFIED WHETHER PERSISTENT: ICD-10-CM

## 2023-03-14 RX ORDER — ALBUTEROL SULFATE 90 UG/1
2 AEROSOL, METERED RESPIRATORY (INHALATION) EVERY 6 HOURS PRN
Qty: 3 EACH | Refills: 3 | Status: SHIPPED | OUTPATIENT
Start: 2023-03-14

## 2023-03-14 RX ORDER — ROSUVASTATIN CALCIUM 10 MG/1
10 TABLET, COATED ORAL NIGHTLY
Qty: 90 TABLET | Refills: 3 | Status: SHIPPED | OUTPATIENT
Start: 2023-03-14

## 2023-03-14 NOTE — TELEPHONE ENCOUNTER
Rx EP'd to pharmacy. Please notify patient.       Requested Prescriptions     Signed Prescriptions Disp Refills    albuterol sulfate HFA (PROAIR HFA) 108 (90 Base) MCG/ACT inhaler 3 each 3     Sig: Inhale 2 puffs into the lungs every 6 hours as needed for Wheezing     Authorizing Provider: Jana Cool           Electronically signed by Ledy Alonzo MD on 3/14/2023 at 2:08 PM

## 2023-03-14 NOTE — TELEPHONE ENCOUNTER
This medication refill is regarding a MyChart request. Refill requested by patient. Requested Prescriptions     Pending Prescriptions Disp Refills    albuterol sulfate HFA (PROAIR HFA) 108 (90 Base) MCG/ACT inhaler       Sig: Inhale 2 puffs into the lungs every 6 hours as needed for Wheezing     Date of last visit: 12/1/2022   Date of next visit: None  Date of last refill: 7/29/22 #1/3 - to local  [facility]    Rx verified, ordered and set to EP.

## 2023-03-14 NOTE — TELEPHONE ENCOUNTER
Rx EP'd to pharmacy. Please notify patient.       Requested Prescriptions     Signed Prescriptions Disp Refills    rosuvastatin (CRESTOR) 10 MG tablet 90 tablet 3     Sig: Take 1 tablet by mouth nightly     Authorizing Provider: Ricky Jesus           Electronically signed by Jamal Briceño MD on 3/14/2023 at 2:07 PM

## 2023-03-14 NOTE — TELEPHONE ENCOUNTER
This medication refill is regarding a MyChart request. Refill requested by patient. Requested Prescriptions     Pending Prescriptions Disp Refills    rosuvastatin (CRESTOR) 10 MG tablet 90 tablet 3     Sig: Take 1 tablet by mouth nightly     Date of last visit: 12/1/2022   Date of next visit: None  Date of last refill: 12/1/22 #90/3 - to local   Pharmacy Name: [facility]    Last Lipid Panel:    Lab Results   Component Value Date/Time    CHOL 262 11/29/2022 02:33 PM    TRIG 95 11/29/2022 02:33 PM    HDL 68 11/29/2022 02:33 PM    [address] 175 11/29/2022 02:33 PM     Last CMP:   Lab Results   Component Value Date     07/27/2022    K 4.3 07/27/2022     07/27/2022    CO2 26 07/27/2022    BUN 13 07/27/2022    CREATININE 0.7 07/27/2022    GLUCOSE 92 07/27/2022    CALCIUM 9.9 07/27/2022    PROT 6.8 12/02/2021    LABALBU 4.7 12/02/2021    BILITOT 0.3 12/02/2021    ALKPHOS 53 12/02/2021    AST 17 01/11/2023    ALT 12 01/11/2023    LABGLOM >90 07/27/2022     Rx verified, ordered and set to EP.

## 2023-09-22 ENCOUNTER — OFFICE VISIT (OUTPATIENT)
Dept: FAMILY MEDICINE CLINIC | Age: 38
End: 2023-09-22
Payer: COMMERCIAL

## 2023-09-22 VITALS
HEART RATE: 68 BPM | RESPIRATION RATE: 16 BRPM | TEMPERATURE: 98.2 F | WEIGHT: 157 LBS | DIASTOLIC BLOOD PRESSURE: 82 MMHG | SYSTOLIC BLOOD PRESSURE: 108 MMHG | BODY MASS INDEX: 25.34 KG/M2

## 2023-09-22 DIAGNOSIS — R22.31 MASS OF AXILLA, RIGHT: Primary | ICD-10-CM

## 2023-09-22 DIAGNOSIS — L82.1 SEBORRHEIC KERATOSIS: ICD-10-CM

## 2023-09-22 PROCEDURE — 99214 OFFICE O/P EST MOD 30 MIN: CPT | Performed by: FAMILY MEDICINE

## 2023-09-22 PROCEDURE — G8427 DOCREV CUR MEDS BY ELIG CLIN: HCPCS | Performed by: FAMILY MEDICINE

## 2023-09-22 PROCEDURE — 1036F TOBACCO NON-USER: CPT | Performed by: FAMILY MEDICINE

## 2023-09-22 PROCEDURE — G8419 CALC BMI OUT NRM PARAM NOF/U: HCPCS | Performed by: FAMILY MEDICINE

## 2023-09-22 SDOH — ECONOMIC STABILITY: FOOD INSECURITY: WITHIN THE PAST 12 MONTHS, THE FOOD YOU BOUGHT JUST DIDN'T LAST AND YOU DIDN'T HAVE MONEY TO GET MORE.: NEVER TRUE

## 2023-09-22 SDOH — ECONOMIC STABILITY: INCOME INSECURITY: HOW HARD IS IT FOR YOU TO PAY FOR THE VERY BASICS LIKE FOOD, HOUSING, MEDICAL CARE, AND HEATING?: NOT HARD AT ALL

## 2023-09-22 SDOH — ECONOMIC STABILITY: HOUSING INSECURITY
IN THE LAST 12 MONTHS, WAS THERE A TIME WHEN YOU DID NOT HAVE A STEADY PLACE TO SLEEP OR SLEPT IN A SHELTER (INCLUDING NOW)?: NO

## 2023-09-22 SDOH — ECONOMIC STABILITY: FOOD INSECURITY: WITHIN THE PAST 12 MONTHS, YOU WORRIED THAT YOUR FOOD WOULD RUN OUT BEFORE YOU GOT MONEY TO BUY MORE.: NEVER TRUE

## 2023-09-22 ASSESSMENT — PATIENT HEALTH QUESTIONNAIRE - PHQ9
SUM OF ALL RESPONSES TO PHQ9 QUESTIONS 1 & 2: 0
SUM OF ALL RESPONSES TO PHQ QUESTIONS 1-9: 0
SUM OF ALL RESPONSES TO PHQ QUESTIONS 1-9: 0
2. FEELING DOWN, DEPRESSED OR HOPELESS: 0
1. LITTLE INTEREST OR PLEASURE IN DOING THINGS: 0
SUM OF ALL RESPONSES TO PHQ QUESTIONS 1-9: 0
SUM OF ALL RESPONSES TO PHQ QUESTIONS 1-9: 0

## 2023-09-22 NOTE — PROGRESS NOTES
163 lb (73.9 kg)       BP Readings from Last 3 Encounters:   09/22/23 108/82   12/01/22 128/84   07/29/22 120/78       Physical Exam  Vitals reviewed. Constitutional:       General: She is not in acute distress. Appearance: She is well-developed. HENT:      Head: Normocephalic and atraumatic. Right Ear: Tympanic membrane normal.      Left Ear: Tympanic membrane normal.      Mouth/Throat:      Mouth: Mucous membranes are moist.      Pharynx: No posterior oropharyngeal erythema. Eyes:      Conjunctiva/sclera: Conjunctivae normal.   Cardiovascular:      Rate and Rhythm: Normal rate and regular rhythm. Heart sounds: No murmur heard. Pulmonary:      Breath sounds: Normal breath sounds. No wheezing. Chest:       Musculoskeletal:      Right lower leg: No edema. Left lower leg: No edema. Lymphadenopathy:      Cervical: No cervical adenopathy. Right cervical: No superficial or posterior cervical adenopathy. Left cervical: No superficial or posterior cervical adenopathy. Upper Body:      Right upper body: No supraclavicular or axillary adenopathy. Left upper body: No supraclavicular or axillary adenopathy. Lower Body: No right inguinal adenopathy. No left inguinal adenopathy. Skin:         Neurological:      Mental Status: She is alert. An electronic signature was used to authenticate this note.         Electronically signed by Talita Devine MD on 9/22/2023 at 8:34 AM

## 2023-09-24 ASSESSMENT — ENCOUNTER SYMPTOMS
RESPIRATORY NEGATIVE: 1
GASTROINTESTINAL NEGATIVE: 1

## 2023-09-25 ENCOUNTER — OFFICE VISIT (OUTPATIENT)
Dept: SURGERY | Age: 38
End: 2023-09-25
Payer: COMMERCIAL

## 2023-09-25 VITALS
HEIGHT: 66 IN | TEMPERATURE: 97.6 F | OXYGEN SATURATION: 99 % | BODY MASS INDEX: 25.39 KG/M2 | RESPIRATION RATE: 18 BRPM | DIASTOLIC BLOOD PRESSURE: 70 MMHG | HEART RATE: 61 BPM | WEIGHT: 158 LBS | SYSTOLIC BLOOD PRESSURE: 122 MMHG

## 2023-09-25 DIAGNOSIS — R22.31 AXILLARY MASS, RIGHT: Primary | ICD-10-CM

## 2023-09-25 DIAGNOSIS — J45.909 UNCOMPLICATED ASTHMA, UNSPECIFIED ASTHMA SEVERITY, UNSPECIFIED WHETHER PERSISTENT: ICD-10-CM

## 2023-09-25 PROBLEM — Z86.16 HISTORY OF COVID-19: Status: ACTIVE | Noted: 2023-09-25

## 2023-09-25 PROBLEM — E78.5 HYPERLIPIDEMIA: Status: ACTIVE | Noted: 2022-12-14

## 2023-09-25 PROCEDURE — G8427 DOCREV CUR MEDS BY ELIG CLIN: HCPCS | Performed by: SURGERY

## 2023-09-25 PROCEDURE — 99203 OFFICE O/P NEW LOW 30 MIN: CPT | Performed by: SURGERY

## 2023-09-25 PROCEDURE — G8419 CALC BMI OUT NRM PARAM NOF/U: HCPCS | Performed by: SURGERY

## 2023-09-26 ASSESSMENT — ENCOUNTER SYMPTOMS
COLOR CHANGE: 0
EYE PAIN: 0
RHINORRHEA: 0
ABDOMINAL DISTENTION: 0
COUGH: 0
ABDOMINAL PAIN: 0
BACK PAIN: 0
APNEA: 0
EYE REDNESS: 0

## 2023-10-20 ENCOUNTER — HOSPITAL ENCOUNTER (OUTPATIENT)
Dept: WOMENS IMAGING | Age: 38
Discharge: HOME | End: 2023-10-20
Attending: FAMILY MEDICINE
Payer: COMMERCIAL

## 2023-10-20 VITALS — BODY MASS INDEX: 25.39 KG/M2 | HEIGHT: 66 IN | WEIGHT: 158 LBS

## 2023-10-20 DIAGNOSIS — R22.31 AXILLARY MASS, RIGHT: ICD-10-CM

## 2023-10-20 PROCEDURE — 76882 US LMTD JT/FCL EVL NVASC XTR: CPT

## 2023-10-20 PROCEDURE — G0279 TOMOSYNTHESIS, MAMMO: HCPCS

## 2024-04-03 ENCOUNTER — NURSE ONLY (OUTPATIENT)
Dept: LAB | Age: 39
End: 2024-04-03

## 2024-04-03 DIAGNOSIS — E78.5 HYPERLIPIDEMIA, UNSPECIFIED HYPERLIPIDEMIA TYPE: ICD-10-CM

## 2024-04-03 LAB
ALBUMIN SERPL BCG-MCNC: 4.6 G/DL (ref 3.5–5.1)
ALP SERPL-CCNC: 48 U/L (ref 38–126)
ALT SERPL W/O P-5'-P-CCNC: 11 U/L (ref 11–66)
ANION GAP SERPL CALC-SCNC: 13 MEQ/L (ref 8–16)
AST SERPL-CCNC: 16 U/L (ref 5–40)
BILIRUB SERPL-MCNC: 0.3 MG/DL (ref 0.3–1.2)
BUN SERPL-MCNC: 15 MG/DL (ref 7–22)
CALCIUM SERPL-MCNC: 9.1 MG/DL (ref 8.5–10.5)
CHLORIDE SERPL-SCNC: 107 MEQ/L (ref 98–111)
CHOLEST SERPL-MCNC: 162 MG/DL (ref 100–199)
CO2 SERPL-SCNC: 20 MEQ/L (ref 23–33)
CREAT SERPL-MCNC: 0.9 MG/DL (ref 0.4–1.2)
GFR SERPL CREATININE-BSD FRML MDRD: 83 ML/MIN/1.73M2
GLUCOSE SERPL-MCNC: 83 MG/DL (ref 70–108)
HDLC SERPL-MCNC: 67 MG/DL
LDLC SERPL CALC-MCNC: 75 MG/DL
POTASSIUM SERPL-SCNC: 4.3 MEQ/L (ref 3.5–5.2)
PROT SERPL-MCNC: 7.4 G/DL (ref 6.1–8)
SODIUM SERPL-SCNC: 140 MEQ/L (ref 135–145)
TRIGL SERPL-MCNC: 102 MG/DL (ref 0–199)

## 2024-04-03 ASSESSMENT — PATIENT HEALTH QUESTIONNAIRE - PHQ9
SUM OF ALL RESPONSES TO PHQ9 QUESTIONS 1 & 2: 0
SUM OF ALL RESPONSES TO PHQ QUESTIONS 1-9: 0
SUM OF ALL RESPONSES TO PHQ QUESTIONS 1-9: 0
SUM OF ALL RESPONSES TO PHQ9 QUESTIONS 1 & 2: 0
1. LITTLE INTEREST OR PLEASURE IN DOING THINGS: NOT AT ALL
SUM OF ALL RESPONSES TO PHQ QUESTIONS 1-9: 0
2. FEELING DOWN, DEPRESSED OR HOPELESS: NOT AT ALL
SUM OF ALL RESPONSES TO PHQ QUESTIONS 1-9: 0
2. FEELING DOWN, DEPRESSED OR HOPELESS: NOT AT ALL
1. LITTLE INTEREST OR PLEASURE IN DOING THINGS: NOT AT ALL

## 2024-04-09 ENCOUNTER — OFFICE VISIT (OUTPATIENT)
Dept: FAMILY MEDICINE CLINIC | Age: 39
End: 2024-04-09
Payer: COMMERCIAL

## 2024-04-09 VITALS
HEART RATE: 72 BPM | TEMPERATURE: 98 F | RESPIRATION RATE: 16 BRPM | SYSTOLIC BLOOD PRESSURE: 122 MMHG | WEIGHT: 143 LBS | BODY MASS INDEX: 22.4 KG/M2 | DIASTOLIC BLOOD PRESSURE: 74 MMHG

## 2024-04-09 DIAGNOSIS — E78.2 MIXED HYPERLIPIDEMIA: ICD-10-CM

## 2024-04-09 DIAGNOSIS — Z00.00 ANNUAL PHYSICAL EXAM: Primary | ICD-10-CM

## 2024-04-09 PROBLEM — Z86.16 HISTORY OF COVID-19: Status: RESOLVED | Noted: 2023-09-25 | Resolved: 2024-04-09

## 2024-04-09 PROCEDURE — 99395 PREV VISIT EST AGE 18-39: CPT | Performed by: FAMILY MEDICINE

## 2024-04-09 RX ORDER — BUPROPION HYDROCHLORIDE 300 MG/1
300 TABLET ORAL EVERY MORNING
COMMUNITY

## 2024-04-09 RX ORDER — ROSUVASTATIN CALCIUM 10 MG/1
10 TABLET, COATED ORAL NIGHTLY
Qty: 90 TABLET | Refills: 3 | Status: SHIPPED | OUTPATIENT
Start: 2024-04-09

## 2024-04-09 RX ORDER — LEVONORGESTREL/ETHINYL ESTRADIOL AND ETHINYL ESTRADIOL 100-20(84)
KIT ORAL
COMMUNITY
Start: 2024-01-25

## 2024-04-09 RX ORDER — METFORMIN HYDROCHLORIDE 500 MG/1
500 TABLET, EXTENDED RELEASE ORAL
COMMUNITY

## 2024-04-09 NOTE — PROGRESS NOTES
2024    Chief Complaint   Patient presents with    Annual Exam     Here for an annual exam.    Discuss Labs     Discuss labs completed from 4/3/24.       Fabián Nina (:  1985) is a 38 y.o. female, here for a preventive medicine evaluation.    Patient Active Problem List   Diagnosis    Anxiety    Uncomplicated asthma    Hyperlipidemia     Patient denies complaint today.  She states that she is feeling very well.  She was getting Ozempic through an online pharmacy but subsequently developed a kidney stone.  She did stop Ozempic but is now taking weight loss medication through [facility].  She was started on Wellbutrin XL, metformin, Topamax, naltrexone, and B12.  She feels that it is working well for her.  Her BMI is currently 22.4.  I encouraged her not to lose too much weight.  She has been less active with exercise due to her busy schedule.  She continues on Crestor for hyperlipidemia.  Her cholesterol significantly improved since starting the medication.  She takes her prescribed medications as directed and denies side effects.  No recent hospitalizations.  Non-smoker.      Review of Systems   Constitutional:  Negative for chills, fatigue and fever.   HENT: Negative.     Eyes: Negative.    Respiratory:  Negative for shortness of breath.    Cardiovascular:  Negative for chest pain, palpitations and leg swelling.   Gastrointestinal:  Negative for abdominal pain, blood in stool, diarrhea, nausea and vomiting.   Genitourinary:  Negative for dysuria.   Musculoskeletal:  Negative for arthralgias and myalgias.   Skin:  Negative for rash.   Neurological:  Negative for dizziness and headaches.   Hematological:  Negative for adenopathy.   Psychiatric/Behavioral: Negative.     All other systems reviewed and are negative.      Prior to Visit Medications    Medication Sig Taking? Authorizing Provider   buPROPion (WELLBUTRIN XL) 300 MG extended release tablet Take 1 tablet by mouth every morning Yes Provider,

## 2024-04-10 ASSESSMENT — ENCOUNTER SYMPTOMS
NAUSEA: 0
SHORTNESS OF BREATH: 0
DIARRHEA: 0
VOMITING: 0
EYES NEGATIVE: 1
ABDOMINAL PAIN: 0
BLOOD IN STOOL: 0

## 2024-08-12 ENCOUNTER — HOSPITAL ENCOUNTER (OUTPATIENT)
Dept: CT IMAGING | Age: 39
Discharge: HOME | End: 2024-08-12
Attending: FAMILY MEDICINE

## 2024-08-12 DIAGNOSIS — Z13.6 SCREENING FOR HEART DISEASE: ICD-10-CM

## 2024-08-12 PROCEDURE — 75571 CT HRT W/O DYE W/CA TEST: CPT

## 2024-09-09 ENCOUNTER — HOSPITAL ENCOUNTER (OUTPATIENT)
Dept: GENERAL RADIOLOGY | Age: 39
Discharge: HOME | End: 2024-09-09
Payer: COMMERCIAL

## 2024-09-09 ENCOUNTER — HOSPITAL ENCOUNTER (OUTPATIENT)
Age: 39
Discharge: HOME | End: 2024-09-09
Payer: COMMERCIAL

## 2024-09-09 DIAGNOSIS — N20.0 CALCULUS OF KIDNEY: ICD-10-CM

## 2024-09-09 PROCEDURE — 74018 RADEX ABDOMEN 1 VIEW: CPT

## 2024-11-09 SDOH — ECONOMIC STABILITY: FOOD INSECURITY: WITHIN THE PAST 12 MONTHS, THE FOOD YOU BOUGHT JUST DIDN'T LAST AND YOU DIDN'T HAVE MONEY TO GET MORE.: NEVER TRUE

## 2024-11-09 SDOH — ECONOMIC STABILITY: FOOD INSECURITY: WITHIN THE PAST 12 MONTHS, YOU WORRIED THAT YOUR FOOD WOULD RUN OUT BEFORE YOU GOT MONEY TO BUY MORE.: NEVER TRUE

## 2024-11-09 SDOH — ECONOMIC STABILITY: TRANSPORTATION INSECURITY
IN THE PAST 12 MONTHS, HAS LACK OF TRANSPORTATION KEPT YOU FROM MEETINGS, WORK, OR FROM GETTING THINGS NEEDED FOR DAILY LIVING?: NO

## 2024-11-09 SDOH — ECONOMIC STABILITY: INCOME INSECURITY: HOW HARD IS IT FOR YOU TO PAY FOR THE VERY BASICS LIKE FOOD, HOUSING, MEDICAL CARE, AND HEATING?: NOT HARD AT ALL

## 2024-11-11 ENCOUNTER — OFFICE VISIT (OUTPATIENT)
Dept: FAMILY MEDICINE CLINIC | Age: 39
End: 2024-11-11
Payer: COMMERCIAL

## 2024-11-11 VITALS
RESPIRATION RATE: 16 BRPM | SYSTOLIC BLOOD PRESSURE: 124 MMHG | HEART RATE: 80 BPM | WEIGHT: 149.2 LBS | BODY MASS INDEX: 23.37 KG/M2 | DIASTOLIC BLOOD PRESSURE: 86 MMHG

## 2024-11-11 DIAGNOSIS — R10.11 RUQ PAIN: Primary | ICD-10-CM

## 2024-11-11 PROCEDURE — 99214 OFFICE O/P EST MOD 30 MIN: CPT | Performed by: NURSE PRACTITIONER

## 2024-11-11 RX ORDER — ONDANSETRON 4 MG/1
4 TABLET, ORALLY DISINTEGRATING ORAL 3 TIMES DAILY PRN
Qty: 21 TABLET | Refills: 0 | Status: SHIPPED | OUTPATIENT
Start: 2024-11-11

## 2024-11-11 ASSESSMENT — ENCOUNTER SYMPTOMS
VOMITING: 1
NAUSEA: 1
ABDOMINAL PAIN: 1

## 2024-11-11 NOTE — PROGRESS NOTES
UMBILICAL HERNIA REPAIR  6/14    Dr. Oscar    URETER SURGERY Right 12/22/2023    CYSTO, RIGHT URETEROSCOPY, BASKET RETRIEVAL OF STONE FRAGMENTS,STENT PLACEMENT RIGHT performed by Ricky De La Fuente MD at [facility] OR    WISDOM TOOTH EXTRACTION       Family History   Problem Relation Age of Onset    High Blood Pressure Father     Diabetes Father     Obesity Father     Alcohol Abuse Father     Asthma Father     High Cholesterol Father     Arrhythmia Father     Atrial Fibrillation Father     Depression Mother     High Blood Pressure Mother     Heart Disease Brother     High Blood Pressure Brother     High Cholesterol Brother     Heart Attack Brother      Social History     Tobacco Use    Smoking status: Never    Smokeless tobacco: Never   Substance Use Topics    Alcohol use: Yes     Alcohol/week: 2.0 standard drinks of alcohol     Types: 2 Glasses of wine per week     Comment: social      Current Outpatient Medications   Medication Sig Dispense Refill    ondansetron (ZOFRAN-ODT) 4 MG disintegrating tablet Take 1 tablet by mouth 3 times daily as needed for Nausea or Vomiting 21 tablet 0    buPROPion (WELLBUTRIN XL) 300 MG extended release tablet Take 1 tablet by mouth every morning      metFORMIN (GLUCOPHAGE-XR) 500 MG extended release tablet Take 1 tablet by mouth daily (with breakfast)      UNABLE TO FIND Combination of Naltrexone 14 mg twice a day, Topiramate 55 mg twice a day, and Vitamin B12 1mg twice a day for weight loss.      LOJAIMIESS 0.1-0.02 & 0.01 MG TABS       rosuvastatin (CRESTOR) 10 MG tablet Take 1 tablet by mouth nightly 90 tablet 3    Coenzyme Q10 (CO Q 10 PO) Take by mouth daily      albuterol sulfate HFA (PROAIR HFA) 108 (90 Base) MCG/ACT inhaler Inhale 2 puffs into the lungs every 6 hours as needed for Wheezing 3 each 3     No current facility-administered medications for this visit.     Allergies   Allergen Reactions    Latex Rash    Other Anaphylaxis     Walnuts hives and throat swells

## 2024-11-14 ENCOUNTER — HOSPITAL ENCOUNTER (OUTPATIENT)
Dept: ULTRASOUND IMAGING | Age: 39
Discharge: HOME | End: 2024-11-14
Payer: COMMERCIAL

## 2024-11-14 DIAGNOSIS — R10.11 RUQ PAIN: ICD-10-CM

## 2024-11-14 PROCEDURE — 76705 ECHO EXAM OF ABDOMEN: CPT

## 2024-11-15 ENCOUNTER — TELEPHONE (OUTPATIENT)
Dept: FAMILY MEDICINE CLINIC | Age: 39
End: 2024-11-15

## 2024-11-15 NOTE — TELEPHONE ENCOUNTER
----- Message from TR Carreon - CNP sent at 11/14/2024  4:34 PM EST -----  Gallbladder is normal on ultrasound - right sided renal stone and probable renal cyst  - recommend close follow-up with her Urologist

## 2025-01-06 ENCOUNTER — HOSPITAL ENCOUNTER (OUTPATIENT)
Dept: CT IMAGING | Age: 40
Discharge: HOME | End: 2025-01-06
Attending: UROLOGY
Payer: COMMERCIAL

## 2025-01-06 DIAGNOSIS — R10.9 ABDOMINAL PAIN, UNSPECIFIED ABDOMINAL LOCATION: ICD-10-CM

## 2025-01-06 PROCEDURE — 74176 CT ABD & PELVIS W/O CONTRAST: CPT

## 2025-03-11 DIAGNOSIS — Z00.00 ANNUAL PHYSICAL EXAM: Primary | ICD-10-CM

## 2025-03-11 DIAGNOSIS — E78.2 MIXED HYPERLIPIDEMIA: ICD-10-CM

## 2025-03-11 RX ORDER — ROSUVASTATIN CALCIUM 10 MG/1
10 TABLET, COATED ORAL DAILY
Qty: 90 TABLET | Refills: 3 | Status: SHIPPED | OUTPATIENT
Start: 2025-03-11

## 2025-03-11 NOTE — TELEPHONE ENCOUNTER
This medication refill is regarding a electronic request. Refill requested by patient.    Requested Prescriptions     Pending Prescriptions Disp Refills    rosuvastatin (CRESTOR) 10 MG tablet [Pharmacy Med Name: ROSUVASTATIN TABS 10MG]  0     Date of last visit: 11/11/2024   Date of next visit: Visit date not found  Date of last refill: 4/9/24#90/3  Pharmacy Name:   : [facility] - [address] [phone] -  [phone]              Last Lipid Panel:    Lab Results   Component Value Date/Time    CHOL 162 04/03/2024 08:15 AM    TRIG 102 04/03/2024 08:15 AM    HDL 67 04/03/2024 08:15 AM     Last CMP:   Lab Results   Component Value Date     04/03/2024    K 4.3 04/03/2024     04/03/2024    CO2 20 (L) 04/03/2024    BUN 15 04/03/2024    CREATININE 0.9 04/03/2024    GLUCOSE 83 04/03/2024    CALCIUM 9.1 04/03/2024    BILITOT 0.3 04/03/2024    ALKPHOS 48 04/03/2024    AST 16 04/03/2024    ALT 11 04/03/2024    LABGLOM 83 04/03/2024       Last Thyroid:    Lab Results   Component Value Date    TSH 1.840 03/24/2016    FT3 SEE BELOW 07/14/2011    T4FREE 1.20 07/14/2011     Last Hemoglobin A1C:    Lab Results   Component Value Date/Time    LABA1C 4.6 12/01/2022 03:07 PM    LABA1C 5.2 12/02/2021 10:19 AM       Rx verified, ordered and set to EP.

## 2025-03-11 NOTE — TELEPHONE ENCOUNTER
Rx EP'd to pharmacy.  Please notify patient.      Requested Prescriptions     Signed Prescriptions Disp Refills    rosuvastatin (CRESTOR) 10 MG tablet 90 tablet 3     Sig: Take 1 tablet by mouth daily     Authorizing Provider: MECHE CAMERON for labs.  Orders pended.      Electronically signed by Meche Cameron MD on 3/11/2025 at 4:50 PM

## 2025-03-12 NOTE — TELEPHONE ENCOUNTER
Spoke with pt and schedule her physical with CG on 4/14/25 at 8:30 AM. Orders placed for pt to complete prior to appt after 12 hour fast. Pt will use [facility] in [facility].

## 2025-05-08 ENCOUNTER — RESULTS FOLLOW-UP (OUTPATIENT)
Dept: FAMILY MEDICINE CLINIC | Age: 40
End: 2025-05-08

## 2025-05-08 ENCOUNTER — LAB (OUTPATIENT)
Dept: LAB | Age: 40
End: 2025-05-08

## 2025-05-08 DIAGNOSIS — Z00.00 ANNUAL PHYSICAL EXAM: ICD-10-CM

## 2025-05-08 LAB
ALBUMIN SERPL BCG-MCNC: 4.5 G/DL (ref 3.4–4.9)
ALP SERPL-CCNC: 45 U/L (ref 38–126)
ALT SERPL W/O P-5'-P-CCNC: 36 U/L (ref 10–35)
ANION GAP SERPL CALC-SCNC: 10 MEQ/L (ref 8–16)
AST SERPL-CCNC: 30 U/L (ref 10–35)
BILIRUB SERPL-MCNC: 0.4 MG/DL (ref 0.3–1.2)
BUN SERPL-MCNC: 8 MG/DL (ref 8–23)
CALCIUM SERPL-MCNC: 9.8 MG/DL (ref 8.6–10)
CHLORIDE SERPL-SCNC: 106 MEQ/L (ref 98–111)
CHOLEST SERPL-MCNC: 147 MG/DL (ref 100–199)
CO2 SERPL-SCNC: 23 MEQ/L (ref 22–29)
CREAT SERPL-MCNC: 0.8 MG/DL (ref 0.5–0.9)
GFR SERPL CREATININE-BSD FRML MDRD: > 90 ML/MIN/1.73M2
GLUCOSE SERPL-MCNC: 87 MG/DL (ref 74–109)
HDLC SERPL-MCNC: 59 MG/DL
LDLC SERPL CALC-MCNC: 74 MG/DL
POTASSIUM SERPL-SCNC: 4.2 MEQ/L (ref 3.5–5.2)
PROT SERPL-MCNC: 6.7 G/DL (ref 6.4–8.3)
SODIUM SERPL-SCNC: 139 MEQ/L (ref 135–145)
TRIGL SERPL-MCNC: 71 MG/DL (ref 0–199)

## 2025-05-26 ASSESSMENT — PATIENT HEALTH QUESTIONNAIRE - PHQ9
2. FEELING DOWN, DEPRESSED OR HOPELESS: SEVERAL DAYS
1. LITTLE INTEREST OR PLEASURE IN DOING THINGS: SEVERAL DAYS
SUM OF ALL RESPONSES TO PHQ QUESTIONS 1-9: 2

## 2025-05-27 ENCOUNTER — OFFICE VISIT (OUTPATIENT)
Dept: FAMILY MEDICINE CLINIC | Age: 40
End: 2025-05-27
Payer: COMMERCIAL

## 2025-05-27 VITALS
TEMPERATURE: 97.7 F | RESPIRATION RATE: 16 BRPM | BODY MASS INDEX: 24.01 KG/M2 | DIASTOLIC BLOOD PRESSURE: 84 MMHG | WEIGHT: 149.4 LBS | SYSTOLIC BLOOD PRESSURE: 110 MMHG | HEIGHT: 66 IN | HEART RATE: 72 BPM

## 2025-05-27 DIAGNOSIS — Z00.00 ANNUAL PHYSICAL EXAM: Primary | ICD-10-CM

## 2025-05-27 DIAGNOSIS — J45.909 UNCOMPLICATED ASTHMA, UNSPECIFIED ASTHMA SEVERITY, UNSPECIFIED WHETHER PERSISTENT: ICD-10-CM

## 2025-05-27 DIAGNOSIS — Z12.31 ENCOUNTER FOR SCREENING MAMMOGRAM FOR MALIGNANT NEOPLASM OF BREAST: ICD-10-CM

## 2025-05-27 DIAGNOSIS — Z23 NEED FOR TDAP VACCINATION: ICD-10-CM

## 2025-05-27 PROCEDURE — 90715 TDAP VACCINE 7 YRS/> IM: CPT | Performed by: FAMILY MEDICINE

## 2025-05-27 PROCEDURE — 90471 IMMUNIZATION ADMIN: CPT | Performed by: FAMILY MEDICINE

## 2025-05-27 PROCEDURE — 99396 PREV VISIT EST AGE 40-64: CPT | Performed by: FAMILY MEDICINE

## 2025-05-27 RX ORDER — ALBUTEROL SULFATE 90 UG/1
2 INHALANT RESPIRATORY (INHALATION) EVERY 6 HOURS PRN
Qty: 1 EACH | Refills: 3 | Status: SHIPPED | OUTPATIENT
Start: 2025-05-27

## 2025-05-27 SDOH — ECONOMIC STABILITY: FOOD INSECURITY: WITHIN THE PAST 12 MONTHS, YOU WORRIED THAT YOUR FOOD WOULD RUN OUT BEFORE YOU GOT MONEY TO BUY MORE.: NEVER TRUE

## 2025-05-27 SDOH — ECONOMIC STABILITY: FOOD INSECURITY: WITHIN THE PAST 12 MONTHS, THE FOOD YOU BOUGHT JUST DIDN'T LAST AND YOU DIDN'T HAVE MONEY TO GET MORE.: NEVER TRUE

## 2025-05-27 ASSESSMENT — ENCOUNTER SYMPTOMS
EYES NEGATIVE: 1
ABDOMINAL PAIN: 0
BLOOD IN STOOL: 0
VOMITING: 0
NAUSEA: 0
SHORTNESS OF BREATH: 0
DIARRHEA: 0

## 2025-05-27 ASSESSMENT — PATIENT HEALTH QUESTIONNAIRE - PHQ9
SUM OF ALL RESPONSES TO PHQ9 QUESTIONS 1 & 2: 2
2. FEELING DOWN, DEPRESSED OR HOPELESS: SEVERAL DAYS
1. LITTLE INTEREST OR PLEASURE IN DOING THINGS: SEVERAL DAYS

## 2025-05-27 NOTE — PROGRESS NOTES
Health Maintenance Due   Topic Date Due    Pneumococcal 0-49 years Vaccine (1 of 2 - PCV) 10/08/2003    Cervical cancer screen  Last done 4-5 years ago by Dr. JOLENE Zendejas, will call for report.    DTaP/Tdap/Td vaccine (2 - Td or Tdap) 08/20/2024       
  Topic Date Due    Pneumococcal 0-49 years Vaccine (1 of 2 - PCV) 10/08/2003    Cervical cancer screen  Never done    DTaP/Tdap/Td vaccine (2 - Td or Tdap) 08/20/2024    COVID-19 Vaccine (3 - 2024-25 season) 05/27/2026 (Originally 9/1/2024)    Flu vaccine (Season Ended) 08/01/2025    Breast cancer screen  10/20/2025    Lipids  05/08/2026    Depression Screen  05/26/2026    Hepatitis B vaccine  Completed    Hepatitis C screen  Completed    HIV screen  Completed    Hepatitis A vaccine  Aged Out    Hib vaccine  Aged Out    HPV vaccine  Aged Out    Polio vaccine  Aged Out    Meningococcal (ACWY) vaccine  Aged Out    Meningococcal B vaccine  Aged Out    Varicella vaccine  Discontinued           Assessment & Plan  Annual physical exam     Seen today for wellness visit.  Discussed the importance of a healthy life style including a balanced diet, good nutrition, physical activity,and injury prevention.  Also discussed the importance of up to date immunizations and annual screenings.           Uncomplicated asthma, unspecified asthma severity, unspecified whether persistent   Chronic, at goal (stable), continue current treatment plan    Orders:    albuterol sulfate HFA (PROAIR HFA) 108 (90 Base) MCG/ACT inhaler; Inhale 2 puffs into the lungs every 6 hours as needed for Wheezing    Encounter for screening mammogram for malignant neoplasm of breast       Orders:    [facility] PAGE DIGITAL SCREEN BILATERAL; Future    Need for Tdap vaccination       Orders:    Tdap, BOOSTRIX, (age 10 yrs+), IM      Follow up yearly and as needed           --Daina Cameron MD

## 2025-05-27 NOTE — ASSESSMENT & PLAN NOTE
Chronic, at goal (stable), continue current treatment plan    Orders:    albuterol sulfate HFA (PROAIR HFA) 108 (90 Base) MCG/ACT inhaler; Inhale 2 puffs into the lungs every 6 hours as needed for Wheezing

## 2025-07-01 ENCOUNTER — HOSPITAL ENCOUNTER (OUTPATIENT)
Dept: WOMENS IMAGING | Age: 40
Discharge: HOME | End: 2025-07-01
Attending: FAMILY MEDICINE
Payer: COMMERCIAL

## 2025-07-01 DIAGNOSIS — Z12.31 ENCOUNTER FOR SCREENING MAMMOGRAM FOR MALIGNANT NEOPLASM OF BREAST: ICD-10-CM

## 2025-07-01 PROCEDURE — 77063 BREAST TOMOSYNTHESIS BI: CPT

## 2025-07-02 ENCOUNTER — PATIENT MESSAGE (OUTPATIENT)
Dept: FAMILY MEDICINE CLINIC | Age: 40
End: 2025-07-02

## 2025-07-02 DIAGNOSIS — R92.2 INCONCLUSIVE MAMMOGRAPHY DUE TO DENSE BREASTS: Primary | ICD-10-CM

## 2025-07-02 DIAGNOSIS — R92.30 INCONCLUSIVE MAMMOGRAPHY DUE TO DENSE BREASTS: Primary | ICD-10-CM

## 2025-07-08 ENCOUNTER — HOSPITAL ENCOUNTER (OUTPATIENT)
Dept: WOMENS IMAGING | Age: 40
Discharge: HOME | End: 2025-07-08
Attending: FAMILY MEDICINE
Payer: COMMERCIAL

## 2025-07-08 DIAGNOSIS — R92.30 INCONCLUSIVE MAMMOGRAPHY DUE TO DENSE BREASTS: ICD-10-CM

## 2025-07-08 DIAGNOSIS — R92.2 INCONCLUSIVE MAMMOGRAPHY DUE TO DENSE BREASTS: ICD-10-CM

## 2025-07-08 PROCEDURE — 76641 ULTRASOUND BREAST COMPLETE: CPT
